# Patient Record
Sex: FEMALE | Race: WHITE | NOT HISPANIC OR LATINO | Employment: FULL TIME | URBAN - METROPOLITAN AREA
[De-identification: names, ages, dates, MRNs, and addresses within clinical notes are randomized per-mention and may not be internally consistent; named-entity substitution may affect disease eponyms.]

---

## 2022-04-09 ENCOUNTER — HOSPITAL ENCOUNTER (EMERGENCY)
Facility: CLINIC | Age: 42
End: 2022-04-09
Payer: COMMERCIAL

## 2022-04-09 ENCOUNTER — HOSPITAL ENCOUNTER (INPATIENT)
Facility: CLINIC | Age: 42
LOS: 3 days | Discharge: HOME | DRG: 419 | End: 2022-04-12
Attending: INTERNAL MEDICINE | Admitting: INTERNAL MEDICINE
Payer: COMMERCIAL

## 2022-04-09 DIAGNOSIS — K80.50 CHOLEDOCHOLITHIASIS: Primary | ICD-10-CM

## 2022-04-09 PROCEDURE — 120N000001 HC R&B MED SURG/OB

## 2022-04-09 PROCEDURE — 99222 1ST HOSP IP/OBS MODERATE 55: CPT | Mod: AI | Performed by: INTERNAL MEDICINE

## 2022-04-09 RX ORDER — PROCHLORPERAZINE MALEATE 5 MG
5 TABLET ORAL EVERY 6 HOURS PRN
Status: DISCONTINUED | OUTPATIENT
Start: 2022-04-09 | End: 2022-04-12 | Stop reason: HOSPADM

## 2022-04-09 RX ORDER — ONDANSETRON 4 MG/1
4 TABLET, ORALLY DISINTEGRATING ORAL EVERY 6 HOURS PRN
Status: DISCONTINUED | OUTPATIENT
Start: 2022-04-09 | End: 2022-04-12 | Stop reason: HOSPADM

## 2022-04-09 RX ORDER — CEFTRIAXONE 2 G/1
2 INJECTION, POWDER, FOR SOLUTION INTRAMUSCULAR; INTRAVENOUS EVERY 24 HOURS
Status: DISCONTINUED | OUTPATIENT
Start: 2022-04-10 | End: 2022-04-12 | Stop reason: HOSPADM

## 2022-04-09 RX ORDER — PROCHLORPERAZINE 25 MG
25 SUPPOSITORY, RECTAL RECTAL EVERY 12 HOURS PRN
Status: DISCONTINUED | OUTPATIENT
Start: 2022-04-09 | End: 2022-04-12 | Stop reason: HOSPADM

## 2022-04-09 RX ORDER — ONDANSETRON 2 MG/ML
4 INJECTION INTRAMUSCULAR; INTRAVENOUS EVERY 6 HOURS PRN
Status: DISCONTINUED | OUTPATIENT
Start: 2022-04-09 | End: 2022-04-12 | Stop reason: HOSPADM

## 2022-04-09 ASSESSMENT — ACTIVITIES OF DAILY LIVING (ADL)
WALKING_OR_CLIMBING_STAIRS_DIFFICULTY: NO
DOING_ERRANDS_INDEPENDENTLY_DIFFICULTY: NO
DIFFICULTY_COMMUNICATING: NO
HEARING_DIFFICULTY_OR_DEAF: NO
WEAR_GLASSES_OR_BLIND: YES
DRESSING/BATHING_DIFFICULTY: NO
CONCENTRATING,_REMEMBERING_OR_MAKING_DECISIONS_DIFFICULTY: NO
FALL_HISTORY_WITHIN_LAST_SIX_MONTHS: NO
DIFFICULTY_EATING/SWALLOWING: NO
TOILETING_ISSUES: NO

## 2022-04-09 ASSESSMENT — COLUMBIA-SUICIDE SEVERITY RATING SCALE - C-SSRS
4. HAVE YOU HAD THESE THOUGHTS AND HAD SOME INTENTION OF ACTING ON THEM?: NO
3. HAVE YOU BEEN THINKING ABOUT HOW YOU MIGHT KILL YOURSELF?: NO
2. HAVE YOU ACTUALLY HAD ANY THOUGHTS OF KILLING YOURSELF IN THE PAST MONTH?: NO
1. IN THE PAST MONTH, HAVE YOU WISHED YOU WERE DEAD OR WISHED YOU COULD GO TO SLEEP AND NOT WAKE UP?: NO

## 2022-04-10 ENCOUNTER — ANESTHESIA (OUTPATIENT)
Dept: SURGERY | Facility: CLINIC | Age: 42
DRG: 419 | End: 2022-04-10
Payer: COMMERCIAL

## 2022-04-10 ENCOUNTER — ANESTHESIA EVENT (OUTPATIENT)
Dept: SURGERY | Facility: CLINIC | Age: 42
DRG: 419 | End: 2022-04-10
Payer: COMMERCIAL

## 2022-04-10 ENCOUNTER — APPOINTMENT (OUTPATIENT)
Dept: GENERAL RADIOLOGY | Facility: CLINIC | Age: 42
DRG: 419 | End: 2022-04-10
Attending: INTERNAL MEDICINE
Payer: COMMERCIAL

## 2022-04-10 LAB
ALBUMIN SERPL-MCNC: 3 G/DL (ref 3.4–5)
ALP SERPL-CCNC: 113 U/L (ref 40–150)
ALT SERPL W P-5'-P-CCNC: 193 U/L (ref 0–50)
ANION GAP SERPL CALCULATED.3IONS-SCNC: 7 MMOL/L (ref 3–14)
AST SERPL W P-5'-P-CCNC: 140 U/L (ref 0–45)
BILIRUB SERPL-MCNC: 3 MG/DL (ref 0.2–1.3)
BUN SERPL-MCNC: 6 MG/DL (ref 7–30)
CALCIUM SERPL-MCNC: 8.3 MG/DL (ref 8.5–10.1)
CHLORIDE BLD-SCNC: 109 MMOL/L (ref 94–109)
CO2 SERPL-SCNC: 23 MMOL/L (ref 20–32)
CREAT SERPL-MCNC: 0.71 MG/DL (ref 0.52–1.04)
ERCP: NORMAL
GFR SERPL CREATININE-BSD FRML MDRD: >90 ML/MIN/1.73M2
GLUCOSE BLD-MCNC: 120 MG/DL (ref 70–99)
INR PPP: 0.99 (ref 0.85–1.15)
POTASSIUM BLD-SCNC: 3.3 MMOL/L (ref 3.4–5.3)
POTASSIUM BLD-SCNC: 3.4 MMOL/L (ref 3.4–5.3)
POTASSIUM BLD-SCNC: 4.2 MMOL/L (ref 3.4–5.3)
PROT SERPL-MCNC: 6.9 G/DL (ref 6.8–8.8)
SODIUM SERPL-SCNC: 139 MMOL/L (ref 133–144)
UPPER EUS: NORMAL

## 2022-04-10 PROCEDURE — 710N000009 HC RECOVERY PHASE 1, LEVEL 1, PER MIN: Performed by: INTERNAL MEDICINE

## 2022-04-10 PROCEDURE — 258N000003 HC RX IP 258 OP 636: Performed by: INTERNAL MEDICINE

## 2022-04-10 PROCEDURE — 999N000179 XR SURGERY CARM FLUORO LESS THAN 5 MIN W STILLS

## 2022-04-10 PROCEDURE — 84132 ASSAY OF SERUM POTASSIUM: CPT | Performed by: INTERNAL MEDICINE

## 2022-04-10 PROCEDURE — 36415 COLL VENOUS BLD VENIPUNCTURE: CPT | Performed by: INTERNAL MEDICINE

## 2022-04-10 PROCEDURE — 250N000011 HC RX IP 250 OP 636: Performed by: INTERNAL MEDICINE

## 2022-04-10 PROCEDURE — 80053 COMPREHEN METABOLIC PANEL: CPT | Performed by: INTERNAL MEDICINE

## 2022-04-10 PROCEDURE — 250N000011 HC RX IP 250 OP 636: Performed by: REGISTERED NURSE

## 2022-04-10 PROCEDURE — 120N000001 HC R&B MED SURG/OB

## 2022-04-10 PROCEDURE — 272N000001 HC OR GENERAL SUPPLY STERILE: Performed by: INTERNAL MEDICINE

## 2022-04-10 PROCEDURE — 258N000003 HC RX IP 258 OP 636: Performed by: ANESTHESIOLOGY

## 2022-04-10 PROCEDURE — 85610 PROTHROMBIN TIME: CPT | Performed by: INTERNAL MEDICINE

## 2022-04-10 PROCEDURE — 99222 1ST HOSP IP/OBS MODERATE 55: CPT | Mod: 57 | Performed by: SURGERY

## 2022-04-10 PROCEDURE — 250N000009 HC RX 250: Performed by: REGISTERED NURSE

## 2022-04-10 PROCEDURE — 258N000003 HC RX IP 258 OP 636: Performed by: PHYSICIAN ASSISTANT

## 2022-04-10 PROCEDURE — 0FC98ZZ EXTIRPATION OF MATTER FROM COMMON BILE DUCT, VIA NATURAL OR ARTIFICIAL OPENING ENDOSCOPIC: ICD-10-PCS | Performed by: INTERNAL MEDICINE

## 2022-04-10 PROCEDURE — 360N000083 HC SURGERY LEVEL 3 W/ FLUORO, PER MIN: Performed by: INTERNAL MEDICINE

## 2022-04-10 PROCEDURE — 99232 SBSQ HOSP IP/OBS MODERATE 35: CPT | Performed by: INTERNAL MEDICINE

## 2022-04-10 PROCEDURE — 250N000013 HC RX MED GY IP 250 OP 250 PS 637: Performed by: INTERNAL MEDICINE

## 2022-04-10 PROCEDURE — 999N000141 HC STATISTIC PRE-PROCEDURE NURSING ASSESSMENT: Performed by: INTERNAL MEDICINE

## 2022-04-10 PROCEDURE — C1769 GUIDE WIRE: HCPCS | Performed by: INTERNAL MEDICINE

## 2022-04-10 PROCEDURE — 370N000017 HC ANESTHESIA TECHNICAL FEE, PER MIN: Performed by: INTERNAL MEDICINE

## 2022-04-10 PROCEDURE — C2617 STENT, NON-COR, TEM W/O DEL: HCPCS | Performed by: INTERNAL MEDICINE

## 2022-04-10 PROCEDURE — C1877 STENT, NON-COAT/COV W/O DEL: HCPCS | Performed by: INTERNAL MEDICINE

## 2022-04-10 PROCEDURE — 250N000009 HC RX 250: Performed by: PHYSICIAN ASSISTANT

## 2022-04-10 DEVICE — STENT BILIARY ADVANIX W/NAVIFLEX 10X5CM M00534680: Type: IMPLANTABLE DEVICE | Site: BILE DUCT | Status: FUNCTIONAL

## 2022-04-10 DEVICE — STENT GEENEN PANCREA 5FRX3CM G22107 GPSO-5-3: Type: IMPLANTABLE DEVICE | Site: PANCREATIC DUCT | Status: FUNCTIONAL

## 2022-04-10 RX ORDER — ACETAMINOPHEN 650 MG/1
650 SUPPOSITORY RECTAL EVERY 6 HOURS PRN
Status: DISCONTINUED | OUTPATIENT
Start: 2022-04-10 | End: 2022-04-12 | Stop reason: HOSPADM

## 2022-04-10 RX ORDER — DEXTROSE MONOHYDRATE, SODIUM CHLORIDE, AND POTASSIUM CHLORIDE 50; 1.49; 4.5 G/1000ML; G/1000ML; G/1000ML
INJECTION, SOLUTION INTRAVENOUS CONTINUOUS
Status: DISCONTINUED | OUTPATIENT
Start: 2022-04-10 | End: 2022-04-12

## 2022-04-10 RX ORDER — SODIUM CHLORIDE, SODIUM LACTATE, POTASSIUM CHLORIDE, CALCIUM CHLORIDE 600; 310; 30; 20 MG/100ML; MG/100ML; MG/100ML; MG/100ML
INJECTION, SOLUTION INTRAVENOUS CONTINUOUS
Status: DISCONTINUED | OUTPATIENT
Start: 2022-04-10 | End: 2022-04-10 | Stop reason: HOSPADM

## 2022-04-10 RX ORDER — NALOXONE HYDROCHLORIDE 0.4 MG/ML
0.4 INJECTION, SOLUTION INTRAMUSCULAR; INTRAVENOUS; SUBCUTANEOUS
Status: DISCONTINUED | OUTPATIENT
Start: 2022-04-10 | End: 2022-04-12 | Stop reason: HOSPADM

## 2022-04-10 RX ORDER — PROPOFOL 10 MG/ML
INJECTION, EMULSION INTRAVENOUS PRN
Status: DISCONTINUED | OUTPATIENT
Start: 2022-04-10 | End: 2022-04-10

## 2022-04-10 RX ORDER — ONDANSETRON 2 MG/ML
4 INJECTION INTRAMUSCULAR; INTRAVENOUS EVERY 6 HOURS PRN
Status: DISCONTINUED | OUTPATIENT
Start: 2022-04-10 | End: 2022-04-12 | Stop reason: HOSPADM

## 2022-04-10 RX ORDER — LIDOCAINE 40 MG/G
CREAM TOPICAL
Status: DISCONTINUED | OUTPATIENT
Start: 2022-04-10 | End: 2022-04-12 | Stop reason: HOSPADM

## 2022-04-10 RX ORDER — LEVOTHYROXINE SODIUM 75 UG/1
150 TABLET ORAL DAILY
Status: DISCONTINUED | OUTPATIENT
Start: 2022-04-10 | End: 2022-04-10

## 2022-04-10 RX ORDER — FLUMAZENIL 0.1 MG/ML
0.2 INJECTION, SOLUTION INTRAVENOUS
Status: ACTIVE | OUTPATIENT
Start: 2022-04-10 | End: 2022-04-11

## 2022-04-10 RX ORDER — NALOXONE HYDROCHLORIDE 0.4 MG/ML
0.2 INJECTION, SOLUTION INTRAMUSCULAR; INTRAVENOUS; SUBCUTANEOUS
Status: DISCONTINUED | OUTPATIENT
Start: 2022-04-10 | End: 2022-04-12 | Stop reason: HOSPADM

## 2022-04-10 RX ORDER — ONDANSETRON 2 MG/ML
INJECTION INTRAMUSCULAR; INTRAVENOUS PRN
Status: DISCONTINUED | OUTPATIENT
Start: 2022-04-10 | End: 2022-04-10

## 2022-04-10 RX ORDER — NORGESTIMATE AND ETHINYL ESTRADIOL 7DAYSX3 LO
1 KIT ORAL DAILY
COMMUNITY

## 2022-04-10 RX ORDER — OXYCODONE HYDROCHLORIDE 5 MG/1
5 TABLET ORAL EVERY 4 HOURS PRN
Status: DISCONTINUED | OUTPATIENT
Start: 2022-04-10 | End: 2022-04-11

## 2022-04-10 RX ORDER — LEVOTHYROXINE SODIUM 200 UG/1
200 TABLET ORAL SEE ADMIN INSTRUCTIONS
COMMUNITY

## 2022-04-10 RX ORDER — PROPOFOL 10 MG/ML
INJECTION, EMULSION INTRAVENOUS CONTINUOUS PRN
Status: DISCONTINUED | OUTPATIENT
Start: 2022-04-10 | End: 2022-04-10

## 2022-04-10 RX ORDER — FENTANYL CITRATE 50 UG/ML
INJECTION, SOLUTION INTRAMUSCULAR; INTRAVENOUS PRN
Status: DISCONTINUED | OUTPATIENT
Start: 2022-04-10 | End: 2022-04-10

## 2022-04-10 RX ORDER — LEVOTHYROXINE SODIUM 100 UG/1
200 TABLET ORAL
Status: DISCONTINUED | OUTPATIENT
Start: 2022-04-11 | End: 2022-04-12 | Stop reason: HOSPADM

## 2022-04-10 RX ORDER — ALBUTEROL SULFATE 90 UG/1
1-2 AEROSOL, METERED RESPIRATORY (INHALATION) EVERY 6 HOURS PRN
COMMUNITY

## 2022-04-10 RX ORDER — ONDANSETRON 4 MG/1
4 TABLET, ORALLY DISINTEGRATING ORAL EVERY 6 HOURS PRN
Status: DISCONTINUED | OUTPATIENT
Start: 2022-04-10 | End: 2022-04-12 | Stop reason: HOSPADM

## 2022-04-10 RX ORDER — LIDOCAINE HYDROCHLORIDE 20 MG/ML
INJECTION, SOLUTION INFILTRATION; PERINEURAL PRN
Status: DISCONTINUED | OUTPATIENT
Start: 2022-04-10 | End: 2022-04-10

## 2022-04-10 RX ORDER — ACETAMINOPHEN 325 MG/1
650 TABLET ORAL EVERY 6 HOURS PRN
Status: DISCONTINUED | OUTPATIENT
Start: 2022-04-10 | End: 2022-04-12 | Stop reason: HOSPADM

## 2022-04-10 RX ORDER — HYDROMORPHONE HCL IN WATER/PF 6 MG/30 ML
0.2 PATIENT CONTROLLED ANALGESIA SYRINGE INTRAVENOUS
Status: DISCONTINUED | OUTPATIENT
Start: 2022-04-10 | End: 2022-04-12 | Stop reason: HOSPADM

## 2022-04-10 RX ORDER — POTASSIUM CHLORIDE 1500 MG/1
40 TABLET, EXTENDED RELEASE ORAL ONCE
Status: COMPLETED | OUTPATIENT
Start: 2022-04-10 | End: 2022-04-10

## 2022-04-10 RX ADMIN — CEFTRIAXONE SODIUM 2 G: 2 INJECTION, POWDER, FOR SOLUTION INTRAMUSCULAR; INTRAVENOUS at 08:50

## 2022-04-10 RX ADMIN — FAMOTIDINE 20 MG: 10 INJECTION, SOLUTION INTRAVENOUS at 09:09

## 2022-04-10 RX ADMIN — SODIUM CHLORIDE, POTASSIUM CHLORIDE, SODIUM LACTATE AND CALCIUM CHLORIDE: 600; 310; 30; 20 INJECTION, SOLUTION INTRAVENOUS at 13:15

## 2022-04-10 RX ADMIN — OXYCODONE HYDROCHLORIDE 5 MG: 5 TABLET ORAL at 17:26

## 2022-04-10 RX ADMIN — LIDOCAINE HYDROCHLORIDE 100 MG: 20 INJECTION, SOLUTION INFILTRATION; PERINEURAL at 13:46

## 2022-04-10 RX ADMIN — POTASSIUM CHLORIDE 40 MEQ: 1500 TABLET, EXTENDED RELEASE ORAL at 11:18

## 2022-04-10 RX ADMIN — POTASSIUM CHLORIDE, DEXTROSE MONOHYDRATE AND SODIUM CHLORIDE: 150; 5; 450 INJECTION, SOLUTION INTRAVENOUS at 09:18

## 2022-04-10 RX ADMIN — DEXTROSE AND SODIUM CHLORIDE: 5; 900 INJECTION, SOLUTION INTRAVENOUS at 00:49

## 2022-04-10 RX ADMIN — FENTANYL CITRATE 50 MCG: 50 INJECTION, SOLUTION INTRAMUSCULAR; INTRAVENOUS at 13:47

## 2022-04-10 RX ADMIN — PROPOFOL 100 MCG/KG/MIN: 10 INJECTION, EMULSION INTRAVENOUS at 13:49

## 2022-04-10 RX ADMIN — PROPOFOL 20 MG: 10 INJECTION, EMULSION INTRAVENOUS at 13:48

## 2022-04-10 RX ADMIN — MIDAZOLAM 2 MG: 1 INJECTION INTRAMUSCULAR; INTRAVENOUS at 13:38

## 2022-04-10 RX ADMIN — FAMOTIDINE 20 MG: 10 INJECTION, SOLUTION INTRAVENOUS at 21:59

## 2022-04-10 RX ADMIN — POTASSIUM CHLORIDE, DEXTROSE MONOHYDRATE AND SODIUM CHLORIDE: 150; 5; 450 INJECTION, SOLUTION INTRAVENOUS at 16:54

## 2022-04-10 RX ADMIN — PROPOFOL 20 MG: 10 INJECTION, EMULSION INTRAVENOUS at 13:53

## 2022-04-10 RX ADMIN — PROPOFOL 10 MG: 10 INJECTION, EMULSION INTRAVENOUS at 13:51

## 2022-04-10 RX ADMIN — ACETAMINOPHEN 650 MG: 325 TABLET ORAL at 09:08

## 2022-04-10 RX ADMIN — FENTANYL CITRATE 50 MCG: 50 INJECTION, SOLUTION INTRAMUSCULAR; INTRAVENOUS at 13:51

## 2022-04-10 RX ADMIN — ONDANSETRON 4 MG: 2 INJECTION INTRAMUSCULAR; INTRAVENOUS at 14:01

## 2022-04-10 RX ADMIN — LEVOTHYROXINE SODIUM 150 MCG: 75 TABLET ORAL at 06:44

## 2022-04-10 ASSESSMENT — ACTIVITIES OF DAILY LIVING (ADL)
ADLS_ACUITY_SCORE: 4
ADLS_ACUITY_SCORE: 6
ADLS_ACUITY_SCORE: 4
ADLS_ACUITY_SCORE: 6
ADLS_ACUITY_SCORE: 4
ADLS_ACUITY_SCORE: 6
ADLS_ACUITY_SCORE: 4
ADLS_ACUITY_SCORE: 6
ADLS_ACUITY_SCORE: 4
ADLS_ACUITY_SCORE: 4

## 2022-04-10 NOTE — PROGRESS NOTES
Patient is alert and oriented x4, VSS on room air. Up independently in room. NPO, IV fluid infusing, patient is c/o of pain but has declined pain medications offered. Will continue to monitor.

## 2022-04-10 NOTE — PROVIDER NOTIFICATION
MD Notification    Notified Person: MD    Notified Person Name: Dr. RAUL La    Notification Date/Time: 4/10/22 7:50 AM    Notification Interaction: paged    Purpose of Notification: elevated Bilirubin of 3.0,  and     Orders Received:    Comments:

## 2022-04-10 NOTE — ANESTHESIA POSTPROCEDURE EVALUATION
Patient: Maria Isabel Garcia    Procedure: Procedure(s):  ENDOSCOPIC RETROGRADE CHOLANGIOPANCREATOGRAPHY, COMPLEX, sphincterotomy, stent placement, stone removal       Anesthesia Type:  MAC    Note:  Disposition: Inpatient   Postop Pain Control: Uneventful            Sign Out: Well controlled pain   PONV:    Neuro/Psych: Uneventful            Sign Out: Acceptable/Baseline neuro status   Airway/Respiratory: Uneventful            Sign Out: Acceptable/Baseline resp. status   CV/Hemodynamics: Uneventful            Sign Out: Acceptable CV status   Other NRE: NONE   DID A NON-ROUTINE EVENT OCCUR? No           Last vitals:  Vitals Value Taken Time   /83 04/10/22 1450   Temp 36.4  C (97.5  F) 04/10/22 1420   Pulse 59 04/10/22 1450   Resp 6 04/10/22 1450   SpO2 100 % 04/10/22 1450   Vitals shown include unvalidated device data.    Electronically Signed By: Carlos Eduardo Roberts MD  April 10, 2022  2:52 PM

## 2022-04-10 NOTE — PROGRESS NOTES
General Surgery - Chart Update    Patient seen by GI. Plan for EUS possible ERCP with Dr. Rob today.     - OK for clear liquids after GI procedure today if also OK with GI  - NPO at midnight  - Plan for laparoscopic cholecystectomy with Dr. Desir tomorrow at 0930. Orders placed  - Possible discharge home tomorrow vs Tuesday pending procedures, improvement in labs  - Recheck LFTs tomorrow AM      Maribell Shoemaker PA-C  [facility]  [phone]

## 2022-04-10 NOTE — PLAN OF CARE
A/Ox4. VSS on RA. Independent in room. C/o headache once, gave PRN Tylenol with help. NPO for ERCP today. Denies nause. +BS with RUQ tenderness. Voiding well per BR. Urine is dark martin, denies any burning sensation w/ urination. Hospitalist made aware. Sent to pre op this afternoon for her procedure.

## 2022-04-10 NOTE — PROGRESS NOTES
[facility]    Hospitalist Progress Note    Assessment & Plan   Maria Isabel Garcia is a 41 year old female admitted on 4/9/2022. She presents as a direct admission from [facility] emergency department with persistent abdominal pain, nausea and vomiting worsened with eating, increasing LFTs, recently performed right upper quadrant abdominal ultrasound with CBD defect concerning for choledocholithiasis     Choledocholithiasis:  Biliary colic  Cholelithiasis   Patient with right upper quadrant pain, postprandial discomfort and nausea and vomiting since Wednesday, more persistent since Friday night.  Increasing LFTs and 4/7/2022 ultrasound at outside facility with mid CBD defect concerning for sludge/choledocholithiasis  -Appreciate GI input, patient for EGD and EUS today.  --Appreciate surgery input, plan for cholecystectomy possibly on 4/11  -Patient has been started on ceftriaxone 2 g daily, not sure patient requires this since there was no sign of cholecystitis.  -Monitor LFTs in a.m.  Continue supportive care     Hypothyroidism:  -Continue levothyroxine 150 mcg daily     Anemia: Patient with a hemoglobin of 11.3 at outside emergency department.   DVT Prophylaxis: SCDs  Code Status: Full Code     Disposition: Expected discharge possibly 4/12 versus 4/11    Makayla La MD  Text Page   (7am to 6pm)    Interval History   Patient is resting, plan for EGD and ERCP later today.  Pain is better controlled.  Plan for cholecystectomy noted for tomorrow.    -Data reviewed today: I reviewed all new labs and imaging results over the last 24 hours.   Physical Exam   Temp: 99.2  F (37.3  C) Temp src: Oral BP: 110/68 Pulse: 69   Resp: 16 SpO2: 98 % O2 Device: None (Room air)    Vitals:    04/10/22 1000   Weight: 67 kg (147 lb 11.2 oz)     Vital Signs with Ranges  Temp:  [98  F (36.7  C)-99.2  F (37.3  C)] 99.2  F (37.3  C)  Pulse:  [69-76] 69  Resp:  [16] 16  BP: (110-136)/(68-87) 110/68  SpO2:  [98 %] 98  %  No intake/output data recorded.    Constitutional: Awake, alert, cooperative, no apparent distress  Respiratory: Clear to auscultation bilaterally, no crackles or wheezing  Cardiovascular: Regular rate and rhythm, normal S1 and S2, and no murmur noted  GI: Normal bowel sounds, soft, right upper quadrant tenderness noted.  Skin/Integumen: No rashes, no cyanosis, no edema  Neuro : moving all 4 extremities, no focal deficit noted     Medications     dextrose 5% and 0.45% NaCl + KCl 20 mEq/L 100 mL/hr at 04/10/22 0918       cefTRIAXone  2 g Intravenous Q24H     famotidine  20 mg Intravenous Q12H     [START ON 4/11/2022] levothyroxine  200 mcg Oral Once per day on Sun Mon Tue Wed Thu Sat     sodium chloride (PF)  3 mL Intracatheter Q8H       Data   Recent Labs   Lab 04/10/22  0755 04/10/22  0606   INR  --  0.99   NA  --  139   POTASSIUM 3.4 3.3*   CHLORIDE  --  109   CO2  --  23   BUN  --  6*   CR  --  0.71   ANIONGAP  --  7   HARLEY  --  8.3*   GLC  --  120*   ALBUMIN  --  3.0*   PROTTOTAL  --  6.9   BILITOTAL  --  3.0*   ALKPHOS  --  113   ALT  --  193*   AST  --  140*     Recent Labs   Lab 04/10/22  0606   *       Imaging:   No results found for this or any previous visit (from the past 24 hour(s)).

## 2022-04-10 NOTE — PHARMACY-ADMISSION MEDICATION HISTORY
Pharmacy Medication History  Admission medication history interview status for the 4/9/2022  admission is complete. See EPIC admission navigator for prior to admission medications     Location of Interview: Patient room  Medication history sources: Patient, Surescripts and Care Everywhere    Significant changes made to the medication list:  NA - New list    In the past week, patient estimated taking medication this percent of the time: 50-90% due to illness    Additional medication history information:   Patient hasn't taken her birth control in a few days due to illness but states she did take her levothyroxine yesterday morning as usual.     Uses her inhaler 1-2 times a month     Medication reconciliation completed by provider prior to medication history? Yes    Time spent in this activity: 20 minutes    Prior to Admission medications    Medication Sig Last Dose Taking? Auth Provider   albuterol (PROAIR HFA/PROVENTIL HFA/VENTOLIN HFA) 108 (90 Base) MCG/ACT inhaler Inhale 1-2 puffs into the lungs every 6 hours as needed for shortness of breath / dyspnea or wheezing  at PRN Yes Unknown, Entered By History   levothyroxine (SYNTHROID/LEVOTHROID) 200 MCG tablet Take 200 mcg by mouth See Admin Instructions Six days a week 4/9/2022 at Unknown time Yes Unknown, Entered By History   norgestim-eth estrad triphasic (ORTHO TRI-CYCLEN LO) 0.18/0.215/0.25 MG-25 MCG tablet Take 1 tablet by mouth in the morning. Past Week at Unknown time Yes Unknown, Entered By History       The information provided in this note is only as accurate as the sources available at the time of update(s)

## 2022-04-10 NOTE — CONSULTS
[facility]  Gastroenterology Consultation         Maria Isabel Garcia  [address]  [facility] [place] [address]  41 year old female    Admission Date/Time: 4/9/2022  Primary Care Provider: Hadley Mejia  Referring / Attending Physician: Fer Ramos    We were asked to see the patient in consultation by Dr. Ramos for evaluation of right upper quadrant abdominal pain elevated LFTs.      CC: Recurrent biliary colic    HPI:  Maria Isabel Garcia is a 41 year old female who was admitted after patient was seen in [facility] with acute onset of severe abdominal pain in the epigastric area right upper quadrant area associated with nausea vomiting elevated liver function tests.  Patient had ultrasound of the abdomen done which showed multiple gallstones with mildly dilated bile duct and suspected sludge versus stone in the gallbladder and in the common bile duct.  Patient was given IV antibiotics Zofran.  Patient was admitted to [facility].  Patient is overall starting to feel better patient is still complaining of some discomfort patient has been dealing with these episodes multiple times.  Patient denied any other significant systemic complaint denied any history of fever chills chest pain.  Her other past medical history significant for hypothyroidism and mild anemia.  No other significant systemic complaint rest of review of system is negative.    ROS: A comprehensive ten point review of systems was negative aside from those in mentioned in the HPI.      PAST MED HX:  I have reviewed this patient's medical history and updated it with pertinent information if needed.   Past Medical History:   Diagnosis Date     Abnormal Pap smear      Asthma     excercise induced     Herpes simplex without mention of complication      Hx: UTI (urinary tract infection)     requirig ureter surgery     Hypothyroid     sees Dr. Zander Mann in [place]       MEDICATIONS:   Prior to Admission Medications   Prescriptions Last  Dose Informant Patient Reported? Taking?   albuterol (PROAIR HFA/PROVENTIL HFA/VENTOLIN HFA) 108 (90 Base) MCG/ACT inhaler  at PRN Self Yes Yes   Sig: Inhale 1-2 puffs into the lungs every 6 hours as needed for shortness of breath / dyspnea or wheezing   levothyroxine (SYNTHROID/LEVOTHROID) 200 MCG tablet 4/9/2022 at Unknown time Self Yes Yes   Sig: Take 200 mcg by mouth See Admin Instructions Six days a week   norgestim-eth estrad triphasic (ORTHO TRI-CYCLEN LO) 0.18/0.215/0.25 MG-25 MCG tablet Past Week at Unknown time Self Yes Yes   Sig: Take 1 tablet by mouth in the morning.      Facility-Administered Medications: None       ALLERGIES:   Allergies   Allergen Reactions     Keflex [Cephalexin Hcl]      rash     Sulfa Drugs      Shortness of breath       SOCIAL HISTORY:  Social History     Tobacco Use     Smoking status: Never Smoker   Substance Use Topics     Alcohol use: No       FAMILY HISTORY:  Family History   Problem Relation Age of Onset     Heart Disease Father        PHYSICAL EXAM:   General awake alert oriented comfortable  Vital Signs with Ranges  Temp: 97.5  F (36.4  C) Temp src: Oral BP: 130/72 Pulse: 89   Resp: 16 SpO2: 100 % O2 Device: None (Room air) Oxygen Delivery: 2 LPM  No intake/output data recorded.    Constitutional: healthy, alert and no distress   Cardiovascular: negative, PMI normal. No lifts, heaves, or thrills. RRR. No murmurs, clicks gallops or rub  Respiratory: negative, Percussion normal. Good diaphragmatic excursion. Lungs clear  Head: Normocephalic. No masses, lesions, tenderness or abnormalities  Neck: Neck supple. No adenopathy. Thyroid symmetric, normal size,, Carotids without bruits.  Abdomen: Abdomen soft, non-tender. BS normal. No masses, organomegaly  SKIN: no suspicious lesions or rashes          ADDITIONAL COMMENTS:   I reviewed the patient's new clinical lab test results.   Recent Labs   Lab Test 04/10/22  0606   INR 0.99     Recent Labs   Lab Test 04/10/22  2644  04/10/22  0606   POTASSIUM 3.4 3.3*   CHLORIDE  --  109   CO2  --  23   BUN  --  6*   ANIONGAP  --  7     Recent Labs   Lab Test 04/10/22  0606   ALBUMIN 3.0*   BILITOTAL 3.0*   *   *       I reviewed the patient's new imaging results.        CONSULTATION ASSESSMENT AND PLAN:    Active Problems:    Choledocholithiasis    Assessment: Very pleasant 41-year-old female with a history of recurrent biliary colic abdominal pain in the epigastric area along with abnormal liver function test and ultrasound findings suspicious for possible common bile duct stone.  Patient history is quite consistent with biliary colic.  Plan to proceed with endoscopic ultrasound and possible ERCP agree with the current plan to continue on supportive care IV fluid IV pain control IV antibiotics appreciate surgical consult I suspect patient will need cholecystectomy.  Finding and plan including risk-benefit discussed in detail with patient and family.  Thank you very much for letting me participate in her care.                Irwin Rob MD, FACP  [facility].  Office: [phone]  Cell : [phone]      [facility]  Ph: [phone] Fax: [phone]                      Recurrent abdominal pain. Biliary colic  Elevated LFTs  NPO  Plan for EUS and Possible ERCP today    Irwin Rob MD FACP  [facility]

## 2022-04-10 NOTE — PROGRESS NOTES
EUS findings consistent with multiple CBD stones and moderate to large amount of sludge in the common bile duct.  ERCP findings consistent with multiple CBD stones and sludge.  Sphincterotomy was done CBD stent was placed and also pancreatic duct stent was placed.  Clear liquid diet today.  Continue on current treatment plan.  Cholecystectomy tomorrow.    Irwin Rob MD

## 2022-04-10 NOTE — CONSULTS
General Surgery Consultation  We are asked by Dr. Fer Ramos to see Maria Isabel Garcia in consultation regarding choledocholithiasis.    Maria Isabel Garcia  YOB: 1980    Age: 41 year old  MRN#: [id number]    Date of Admission: 2022  Surgeon:   Naren Hayward MD                  Chief Complaint:   Abdominal pain         History of Present Illness:   This patient is a 41 year old female who was [facility] from an outside facility for findings of choledocholithiasis. Maria Isabel tells me she has had right upper quadrant abdominal pain with nausea/vomiting especially after eating that started on Wednesday. This worsened and prompted her to seek care at the [facility] yesterday. Results from outside facility are viewable in CareEverywhere. On presentation her labs included WBC 9.5, hemoglobin 11.3, platelets 333, total bili 4.0, , , Alk phos 119, lipase 62.  Maria Isabel states that she has never had similar symptoms in the past. She has had 3 pregnancies, 2 living children (youngest 8yo), no issues during pregnancy.  Surgical history includes  section x 2. No history of general anesthesia. History is obtained from the patient and chart. Patient denies any known personal or family history of bleeding disorder, clotting disorder, anesthesia reaction. Maria Isabel tells me she is currently feeling much better since she has been NPO.         Past Medical History:    has a past medical history of Abnormal Pap smear, Asthma, Herpes simplex without mention of complication, UTI (urinary tract infection), and Hypothyroid.          Past Surgical History:     Past Surgical History:   Procedure Laterality Date     C/SECTION, LOW TRANSVERSE        SECTION  10/21/2013    Procedure:  SECTION;  REPEAT  SECTION;  Surgeon: Hadley Mejia MD;  Location:  L+D     COLPOSCOPY CERVIX, LOOP ELECTRODE BIOPSY, COMBINED              Medications:     Prior to Admission medications     Medication Sig Start Date End Date Taking? Authorizing Provider   ibuprofen (ADVIL,MOTRIN) 400-800 mg tablet Take 1-2 tablets (400-800 mg) by mouth every 6 hours as needed (cramping) 10/24/13   Hadley Mejia MD   Levothyroxine Sodium (SYNTHROID PO) Take 150 mcg by mouth daily     Reported, Patient   oxyCODONE (ROXICODONE) 5 MG immediate release tablet Take 1-2 tablets (5-10 mg) by mouth every 3 hours as needed 10/24/13   Hadley Mejia MD   Prenatal Vit-Fe Fumarate-FA (PRENATAL MULTIVITAMIN  PLUS IRON) 27-0.8 MG TABS Take 1 tablet by mouth daily    Reported, Patient   senna-docusate (SENOKOT-S;PERICOLACE) 8.6-50 MG per tablet Take 1-2 tablets by mouth 2 times daily 10/24/13   Hadley Mejia MD            Allergies:     Allergies   Allergen Reactions     Keflex [Cephalexin Hcl]      rash     Sulfa Drugs      Shortness of breath            Social History:     Social History     Tobacco Use     Smoking status: Never Smoker     Smokeless tobacco: Not on file   Substance Use Topics     Alcohol use: No             Family History:   POSITIVE for heart disease. This patient has no pertinent family history, specifically no family history of any bleeding, clotting or anesthesia problems.          Review of Systems:   Brief ROS is negative other than noted in the HPI.  Constitutional: NEGATIVE for fever, chills, change in weight  Respiratory: NEGATIVE for significant cough or SOB  Cardiovascular: NEGATIVE for chest pain, palpitations or peripheral edema  Gastrointestinal: POSITIVE for abdominal pain, nausea, vomiting   Hematologic: NEGATIVE for bleeding problems         Physical Exam:   Blood pressure 110/68, pulse 69, temperature 99.2  F (37.3  C), temperature source Oral, resp. rate 16, SpO2 98 %, unknown if currently breastfeeding.  No intake/output data recorded.    General - This is a very pleasant, well developed, well nourished female in no apparent distress. Alert and oriented x 3.  Head -  normocephalic, atraumatic  Eyes - no scleral icterus, extraocular movements intact  Neck - supple without masses, trachea midline, no lymphadenopathy or thyromegaly  Respiratory - lungs clear to auscultation bilaterally without wheezes, rales or rhonchi   Cardiovascular - regular rate and rhythm; S1 and S2 distinct without murmur   Abdomen - Soft, nondistended. Some mild RUQ tenderness (improved today per patient). No Valentin sign. No palpable masses or organomegaly. No rebound or guarding  Extremities - Moves all extremities. Warm without edema. No calf tenderness  Neurologic - Nonfocal          Data:   Labs:  Recent Labs   Lab Test 04/10/22  0606   POTASSIUM 3.3*   CHLORIDE 109   CO2 23   BUN 6*   CR 0.71     Recent Labs   Lab Test 04/10/22  0606   BILITOTAL 3.0*   *   *   ALKPHOS 113     Recent Labs   Lab Test 04/10/22  0606   INR 0.99     Recent Labs   Lab Test 04/10/22  0606   HARLEY 8.3*     Recent Labs   Lab Test 04/10/22  0606   ANIONGAP 7   ALBUMIN 3.0*       Ultrasound of the abdomen:   FINDINGS:   Liver: The liver is somewhat increased in size with demonstration of likely a elongated Riedel`s lobe. No masses.  No intrahepatic biliary dilatation.       Gallbladder: There are dependent calculi appreciated within the gallbladder lumen. Normal wall thickness.  No pericholecystic fluid.       Common bile duct: 5 mm. There is questionable radiopaque sludge within the common bile duct versus a small choledocholith.     Pancreas: Normal.       Right kidney: The right kidney is normal in size contour and echogenicity.     Impression:   Cholelithiasis and questionable choledocholithiasis with radiopaque debris/calculus within the mid common bile duct. Correlate with MRCP.     Mild hepatomegaly and hepatic steatosis.            Assessment:   Maria Isabel Garcia is a 41 year old female with symptomatic cholelithiasis, choledocholithiasis. Total bilirubin improving today (4.0 > 3.0).          Plan:   - GI consult  for EUS, likely ERCP. Await recommendations. Possibly passed a stone given improvement in bilirubin and pain, but would likely still benefit from procedure to evaluate biliary tree and clear any residual debris.  - Discussed cholecystectomy during this admission to prevent future occurrences. Patient is interested in this. Briefly discussed surgical details, risks, benefits, alternatives, expected recovery. Surgery would likely be done the day after EUS/ERCP unless we are able to coordinate OR timing to be done concurrently.   - Continue NPO. IV fluids changed to K-containing due to slight hypokalemia. Anti-emetics and pain medications available prn  - Activity as tolerated. PCDs while resting  - No evidence of cholecystitis, but agree with Rocephin until GI procedure complete  - Pepcid BID  - Medical management per hospitalist     I have discussed the history, physical, and plan with Dr. Hayward who will independently interview and examine the patient and update the stated plan as indicated.        Maribell Shoemaker PA-C  [facility]  [phone]

## 2022-04-10 NOTE — ANESTHESIA PREPROCEDURE EVALUATION
Anesthesia Pre-Procedure Evaluation    Patient: Maria Isabel Garcia   MRN: [id number] : 1980        Procedure : Procedure(s):  ENDOSCOPIC RETROGRADE CHOLANGIOPANCREATOGRAPHY, COMPLEX          Past Medical History:   Diagnosis Date     Abnormal Pap smear      Asthma     excercise induced     Herpes simplex without mention of complication      Hx: UTI (urinary tract infection)     requirig ureter surgery     Hypothyroid     sees Dr. Zander Mann in [place]      Past Surgical History:   Procedure Laterality Date     C/SECTION, LOW TRANSVERSE        SECTION  10/21/2013    Procedure:  SECTION;  REPEAT  SECTION;  Surgeon: Hadley Mejia MD;  Location:  L+D     COLPOSCOPY CERVIX, LOOP ELECTRODE BIOPSY, COMBINED        Allergies   Allergen Reactions     Keflex [Cephalexin Hcl]      rash     Sulfa Drugs      Shortness of breath      Social History     Tobacco Use     Smoking status: Never Smoker     Smokeless tobacco: Not on file   Substance Use Topics     Alcohol use: No      Wt Readings from Last 1 Encounters:   04/10/22 67 kg (147 lb 11.2 oz)        Anesthesia Evaluation   Pt has had prior anesthetic.     No history of anesthetic complications       ROS/MED HX  ENT/Pulmonary:     (+) Intermittent, asthma     Neurologic:  - neg neurologic ROS     Cardiovascular:  - neg cardiovascular ROS  (-) murmur   METS/Exercise Tolerance:     Hematologic:  - neg hematologic  ROS     Musculoskeletal:  - neg musculoskeletal ROS     GI/Hepatic:     (+) cholecystitis/cholelithiasis,     Renal/Genitourinary:  - neg Renal ROS     Endo:     (+) thyroid problem, hypothyroidism,  (-) Type II DM   Psychiatric/Substance Use:  - neg psychiatric ROS     Infectious Disease:  - neg infectious disease ROS     Malignancy:  - neg malignancy ROS     Other:  - neg other ROS          Physical Exam    Airway        Mallampati: II   TM distance: > 3 FB   Neck ROM: full   Mouth opening: > 3 cm    Respiratory Devices and  Support         Dental  no notable dental history         Cardiovascular   cardiovascular exam normal       Rhythm and rate: regular (-) no murmur    Pulmonary   pulmonary exam normal                OUTSIDE LABS:  CBC:   Lab Results   Component Value Date    HGB 10.3 (L) 10/22/2013    HGB 12.5 10/21/2013    HCT 38.3 03/14/2013     K 03/14/2013     BMP:   Lab Results   Component Value Date     04/10/2022    POTASSIUM 3.4 04/10/2022    POTASSIUM 3.3 (L) 04/10/2022    CHLORIDE 109 04/10/2022    CO2 23 04/10/2022    BUN 6 (L) 04/10/2022    CR 0.71 04/10/2022     (H) 04/10/2022     COAGS:   Lab Results   Component Value Date    INR 0.99 04/10/2022     POC: No results found for: BGM, HCG, HCGS  HEPATIC:   Lab Results   Component Value Date    ALBUMIN 3.0 (L) 04/10/2022    PROTTOTAL 6.9 04/10/2022     (H) 04/10/2022     (H) 04/10/2022    ALKPHOS 113 04/10/2022    BILITOTAL 3.0 (H) 04/10/2022     OTHER:   Lab Results   Component Value Date    HARLEY 8.3 (L) 04/10/2022       Anesthesia Plan    ASA Status:  2   NPO Status:  NPO Appropriate    Anesthesia Type: MAC.     - Reason for MAC: straight local not clinically adequate   Induction: Intravenous, Propofol.           Consents    Anesthesia Plan(s) and associated risks, benefits, and realistic alternatives discussed. Questions answered and patient/representative(s) expressed understanding.    - Discussed:     - Discussed with:  Patient      - Extended Intubation/Ventilatory Support Discussed: No.      - Patient is DNR/DNI Status: No    Use of blood products discussed: No .     Postoperative Care    Pain management: IV analgesics.   PONV prophylaxis: Ondansetron (or other 5HT-3), Dexamethasone or Solumedrol     Comments:    Other Comments: Patient is counseled on the anesthesia plan and relevant anesthesia procedures including all risks and benefits. All patient questions were answered.             Carlos Eduardo Roberts MD

## 2022-04-10 NOTE — H&P
[facility]    History and Physical - Hospitalist Service       Date of Admission:  4/9/2022    Assessment & Plan      Maria Isabel Garcia is a 41 year old female admitted on 4/9/2022. She presents as a direct admission from [facility] emergency department with persistent abdominal pain, nausea and vomiting worsened with eating, increasing LFTs, recently performed right upper quadrant abdominal ultrasound with CBD defect concerning for choledocholithiasis    Choledocholithiasis: Patient with right upper quadrant pain, postprandial discomfort and nausea and vomiting since Wednesday, more persistent since Friday night.  Increasing LFTs and 4/7/2022 ultrasound at outside facility with mid CBD defect concerning for sludge/choledocholithiasis  -As needed Zofran, Compazine for nausea and vomiting  -D5 half-normal saline plus potassium at 100/h  -Gastroenterology consulted anticipating ERCP  -General surgery consulted for cholecystectomy prior to discharge  -Repeat CMP and INR in a.m.  If significant worsening LFTs, will likely require ERCP, if improving, argument could be made for intraoperative cholangiogram  -Ceftriaxone 2 g every 24 hours until ERCP complete  -Acetaminophen, oxycodone, as needed Dilaudid for pain control.  If significant worsening of LFTs, can discontinue acetaminophen    Hypothyroidism:  -Continue levothyroxine 150 mcg daily    Anemia: Patient with a hemoglobin of 11.3 at outside emergency department.  MCV of 79.  Most consistent with iron deficiency anemia.  -Defer ongoing work-up and management to primary care physician.  Would not initiate iron supplementation currently with choledocholithiasis given risk of developing infectious process    Diet:  N.p.o. anticipating procedural intervention  DVT Prophylaxis: Pneumatic Compression Devices  Solomon Catheter: Not present  Central Lines: None  Cardiac Monitoring: None  Code Status:  Full code.  Confirmed with patient on  admission    Clinically Significant Risk Factors Present on Admission                      Disposition Plan   Expected Discharge:  anticipated discharge Monday or Tuesday pending timing of ERCP and surgical intervention with subsequent recovery  Anticipated discharge location:  Awaiting care coordination huddle  Delays:             The patient's care was discussed with the Patient and Transferring provider at [facility] ER.    Fer Ramos MD  Hospitalist Service  [facility]  Securely message with the Vocera Web Console (learn more here)  Text page via [employer] Paging/Directory         ______________________________________________________________________    Chief Complaint   Abdominal pain, nausea and vomiting    History is obtained from patient, chart review, review of outside records including recent upper quadrant ultrasound report with CBD finding concerning for choledocholithiasis without biliary dilation, discussion with transferring ER provider    History of Present Illness   Maria Isabel Garcia is a 41 year old female who presents as a direct admission from [facility] emergency department with concerns for choledocholithiasis.    Patient is a generally healthy 41-year-old female.  She has never had abdominal pain associate with nausea and vomiting similar to what she is currently experiencing up until Wednesday of this past week.  She tells me that Wednesday evening, she had dinner, had some relatively sharp right upper quadrant abdominal pain after this.  Lasted several hours, resolved by Thursday.  Thursday evening, again after evening, she had recurrence of her right upper quadrant discomfort, this time associated with nonbloody emesis.  Again improved, though uncertain if it completely resolved as patient went to bed and had pain again Friday a.m..  Friday morning discomfort was more persistent, went to the emergency department for evaluation where she was found to have reassuring  LFTs, though right upper quadrant ultrasound was notable for some sludge in the gallbladder and possible sludge or stone in mid common bile duct.  An MRCP was recommended.  Patient received a GI cocktail, her discomfort improved, and she was discharged from the emergency department with recommendation to follow-up with gastroenterology.    After returning home, pain again recurred, has been present throughout Saturday.  Patient returned again to [facility] emergency department where LFTs are now noted to be elevated.  No fevers, no rigors.    Patient was transferred to [facility] for gastroenterology evaluation and intervention.  Bilirubin went from 1 to 4 over the preceding 24 hours, LFTs also increasing mildly.    Currently she tells me she is somewhat hungry, though has symptoms every time she eats, so is hesitant to do so.  Pain still persists in the right upper quadrant, though remainder of abdomen is spared.  Some loose stools, no diarrhea.  Again, no blood in emesis.  Still has some occasional nausea.    Review of Systems    The 10 point Review of Systems is negative other than noted in the HPI or here.  No fevers, no chills or rigors  No shortness of breath, though does have pain with deep inspiration    Past Medical History    I have reviewed this patient's medical history and updated it with pertinent information if needed.   Past Medical History:   Diagnosis Date     Abnormal Pap smear      Asthma     excercise induced     Herpes simplex without mention of complication      Hx: UTI (urinary tract infection)     requirig ureter surgery     Hypothyroid     sees Dr. Zander Mann in [place]        Past Surgical History   I have reviewed this patient's surgical history and updated it with pertinent information if needed.  Past Surgical History:   Procedure Laterality Date     C/SECTION, LOW TRANSVERSE        SECTION  10/21/2013    Procedure:  SECTION;  REPEAT  SECTION;   Surgeon: Hadley Mejia MD;  Location:  L+D     COLPOSCOPY CERVIX, LOOP ELECTRODE BIOPSY, COMBINED         Social History   I have reviewed this patient's social history and updated it with pertinent information if needed.  Social History     Tobacco Use     Smoking status: Never Smoker     Smokeless tobacco: Not on file   Substance Use Topics     Alcohol use: No            Family History   I have reviewed this patient's family history and updated it with pertinent information if needed.  Family History   Problem Relation Age of Onset     Heart Disease Father    Father's first cardiac events were in his 40s; he was a heavy smoker  Patient unaware of any family history of gallbladder or biliary disease    Prior to Admission Medications   Prior to Admission Medications   Prescriptions Last Dose Informant Patient Reported? Taking?   Levothyroxine Sodium (SYNTHROID PO)   Yes No   Sig: Take 150 mcg by mouth daily    Prenatal Vit-Fe Fumarate-FA (PRENATAL MULTIVITAMIN  PLUS IRON) 27-0.8 MG TABS   Yes No   Sig: Take 1 tablet by mouth daily   ibuprofen (ADVIL,MOTRIN) 400-800 mg tablet   No No   Sig: Take 1-2 tablets (400-800 mg) by mouth every 6 hours as needed (cramping)   oxyCODONE (ROXICODONE) 5 MG immediate release tablet   No No   Sig: Take 1-2 tablets (5-10 mg) by mouth every 3 hours as needed   senna-docusate (SENOKOT-S;PERICOLACE) 8.6-50 MG per tablet   No No   Sig: Take 1-2 tablets by mouth 2 times daily      Facility-Administered Medications: None     Allergies   Allergies   Allergen Reactions     Keflex [Cephalexin Hcl]      Sulfa Drugs        Physical Exam   Vital Signs: Temp: 98.2  F (36.8  C) Temp src: Oral BP: 136/87 Pulse: 71   Resp: 16          General Appearance: Well-appearing 41-year-old female resting on hospital bed.  She does not appear toxic or in acute distress  Eyes: No scleral icterus or injection  HEENT: Normocephalic, atraumatic  Respiratory: Breath sounds are clear bilateral to  auscultation.  With deep inspiration, patient does have right upper quadrant abdominal pain.  Cardiovascular: Regular rate and rhythm.  Heart rate currently in the 70s with no murmur.  GI: Bowel sounds present, though patient with tenderness and guarding in the right upper quadrant.  Left abdomen and suprapubic abdomen spared.  No rebound tenderness.  No palpable mass.  Lymph/Hematologic: No lower extremity edema  Genitourinary: Not examined  Skin: No appreciable jaundice despite bilirubin of 4, no petechiae  Musculoskeletal: Muscular tone and bulk intact in all extremities and appropriate for age.  Neurologic: Alert, conversant, appropriate conversation good mental status grossly intact.  Psychiatric: Very pleasant, normal affect    Data   Data reviewed today: I reviewed all medications, new labs and imaging results over the last 24 hours. I personally reviewed no images or EKG's today.    4/10/2022 CMP with total bilirubin of 4.0, , , alkaline phosphatase of 119    On 4/8/2022 labs, AST was 138, alkaline phosphatase was 91, bilirubin was 1.0    4/10/2022 creatinine of 0.8, BMP generally within normal limits

## 2022-04-10 NOTE — ANESTHESIA CARE TRANSFER NOTE
Patient: Maria Isabel CROWDER Radha    Procedure: Procedure(s):  ENDOSCOPIC RETROGRADE CHOLANGIOPANCREATOGRAPHY, COMPLEX, sphincterotomy, stent placement, stone removal       Diagnosis: Choledocholithiasis [K80.50]  Diagnosis Additional Information: No value filed.    Anesthesia Type:   MAC     Note:    Oropharynx: oropharynx clear of all foreign objects  Level of Consciousness: awake  Oxygen Supplementation: nasal cannula  Level of Supplemental Oxygen (L/min / FiO2): 3  Independent Airway: airway patency satisfactory and stable  Dentition: dentition unchanged  Vital Signs Stable: post-procedure vital signs reviewed and stable  Report to RN Given: handoff report given  Patient transferred to: PACU  Comments: VSS, spontaneously breathing with sats %.  To PACU, monitors on, report to RN.  Handoff Report: Identifed the Patient, Identified the Reponsible Provider, Reviewed the pertinent medical history, Discussed the surgical course, Reviewed Intra-OP anesthesia mangement and issues during anesthesia, Set expectations for post-procedure period and Allowed opportunity for questions and acknowledgement of understanding      Vitals:  Vitals Value Taken Time   BP     Temp     Pulse     Resp     SpO2         Electronically Signed By: CITLALY Mendoza CRNA  April 10, 2022  2:22 PM

## 2022-04-10 NOTE — PROVIDER NOTIFICATION
MD Notification    Notified Person: MD    Notified Person Name: Ramos    Notification Date/Time: 4-9-2022 2320    Notification Interaction: Text Page    Purpose of Notification: [address]    Direct admit from [facility] is in the room.  Thanks Tad LEDESMA RN *[id number]    Orders Received:    Comments:

## 2022-04-10 NOTE — INTERVAL H&P NOTE
I have reviewed the surgical (or preoperative) H&P that is linked to this encounter, and examined the patient. There are no significant changes    Clinical Conditions Present on Arrival:  Clinically Significant Risk Factors Present on Admission               # Hypoalbuminemia: Albumin = 3.0 g/dL (Ref range: 3.4 - 5.0 g/dL) on admission, will monitor as appropriate

## 2022-04-11 ENCOUNTER — ANESTHESIA EVENT (OUTPATIENT)
Dept: SURGERY | Facility: CLINIC | Age: 42
DRG: 419 | End: 2022-04-11
Payer: COMMERCIAL

## 2022-04-11 ENCOUNTER — ANESTHESIA (OUTPATIENT)
Dept: SURGERY | Facility: CLINIC | Age: 42
DRG: 419 | End: 2022-04-11
Payer: COMMERCIAL

## 2022-04-11 LAB
ALBUMIN SERPL-MCNC: 2.9 G/DL (ref 3.4–5)
ALP SERPL-CCNC: 110 U/L (ref 40–150)
ALT SERPL W P-5'-P-CCNC: 162 U/L (ref 0–50)
ANION GAP SERPL CALCULATED.3IONS-SCNC: 3 MMOL/L (ref 3–14)
AST SERPL W P-5'-P-CCNC: 76 U/L (ref 0–45)
BILIRUB SERPL-MCNC: 0.8 MG/DL (ref 0.2–1.3)
BUN SERPL-MCNC: 4 MG/DL (ref 7–30)
CALCIUM SERPL-MCNC: 8.5 MG/DL (ref 8.5–10.1)
CHLORIDE BLD-SCNC: 109 MMOL/L (ref 94–109)
CO2 SERPL-SCNC: 25 MMOL/L (ref 20–32)
CREAT SERPL-MCNC: 0.84 MG/DL (ref 0.52–1.04)
ERYTHROCYTE [DISTWIDTH] IN BLOOD BY AUTOMATED COUNT: 15.5 % (ref 10–15)
GFR SERPL CREATININE-BSD FRML MDRD: 88 ML/MIN/1.73M2
GLUCOSE BLD-MCNC: 110 MG/DL (ref 70–99)
HCG UR QL: NEGATIVE
HCT VFR BLD AUTO: 32.3 % (ref 35–47)
HGB BLD-MCNC: 10.1 G/DL (ref 11.7–15.7)
MCH RBC QN AUTO: 25.4 PG (ref 26.5–33)
MCHC RBC AUTO-ENTMCNC: 31.3 G/DL (ref 31.5–36.5)
MCV RBC AUTO: 81 FL (ref 78–100)
PLATELET # BLD AUTO: 295 10E3/UL (ref 150–450)
POTASSIUM BLD-SCNC: 4.3 MMOL/L (ref 3.4–5.3)
PROT SERPL-MCNC: 6.7 G/DL (ref 6.8–8.8)
RBC # BLD AUTO: 3.98 10E6/UL (ref 3.8–5.2)
SODIUM SERPL-SCNC: 137 MMOL/L (ref 133–144)
WBC # BLD AUTO: 6.5 10E3/UL (ref 4–11)

## 2022-04-11 PROCEDURE — 81025 URINE PREGNANCY TEST: CPT | Performed by: SURGERY

## 2022-04-11 PROCEDURE — 272N000001 HC OR GENERAL SUPPLY STERILE: Performed by: SURGERY

## 2022-04-11 PROCEDURE — 258N000003 HC RX IP 258 OP 636: Performed by: PHYSICIAN ASSISTANT

## 2022-04-11 PROCEDURE — 47562 LAPAROSCOPIC CHOLECYSTECTOMY: CPT | Performed by: SURGERY

## 2022-04-11 PROCEDURE — 258N000003 HC RX IP 258 OP 636: Performed by: ANESTHESIOLOGY

## 2022-04-11 PROCEDURE — 88304 TISSUE EXAM BY PATHOLOGIST: CPT | Mod: TC | Performed by: SURGERY

## 2022-04-11 PROCEDURE — 250N000011 HC RX IP 250 OP 636: Performed by: INTERNAL MEDICINE

## 2022-04-11 PROCEDURE — 250N000025 HC SEVOFLURANE, PER MIN: Performed by: SURGERY

## 2022-04-11 PROCEDURE — 710N000009 HC RECOVERY PHASE 1, LEVEL 1, PER MIN: Performed by: SURGERY

## 2022-04-11 PROCEDURE — 258N000001 HC RX 258: Performed by: SURGERY

## 2022-04-11 PROCEDURE — 250N000009 HC RX 250: Performed by: PHYSICIAN ASSISTANT

## 2022-04-11 PROCEDURE — 250N000009 HC RX 250: Performed by: NURSE ANESTHETIST, CERTIFIED REGISTERED

## 2022-04-11 PROCEDURE — 250N000013 HC RX MED GY IP 250 OP 250 PS 637: Performed by: PHYSICIAN ASSISTANT

## 2022-04-11 PROCEDURE — 250N000013 HC RX MED GY IP 250 OP 250 PS 637: Performed by: HOSPITALIST

## 2022-04-11 PROCEDURE — 47562 LAPAROSCOPIC CHOLECYSTECTOMY: CPT | Mod: AS | Performed by: PHYSICIAN ASSISTANT

## 2022-04-11 PROCEDURE — 99232 SBSQ HOSP IP/OBS MODERATE 35: CPT | Performed by: INTERNAL MEDICINE

## 2022-04-11 PROCEDURE — 85027 COMPLETE CBC AUTOMATED: CPT | Performed by: INTERNAL MEDICINE

## 2022-04-11 PROCEDURE — 360N000076 HC SURGERY LEVEL 3, PER MIN: Performed by: SURGERY

## 2022-04-11 PROCEDURE — 250N000013 HC RX MED GY IP 250 OP 250 PS 637

## 2022-04-11 PROCEDURE — 250N000011 HC RX IP 250 OP 636: Performed by: ANESTHESIOLOGY

## 2022-04-11 PROCEDURE — 370N000017 HC ANESTHESIA TECHNICAL FEE, PER MIN: Performed by: SURGERY

## 2022-04-11 PROCEDURE — 80053 COMPREHEN METABOLIC PANEL: CPT | Performed by: PHYSICIAN ASSISTANT

## 2022-04-11 PROCEDURE — 120N000001 HC R&B MED SURG/OB

## 2022-04-11 PROCEDURE — 999N000141 HC STATISTIC PRE-PROCEDURE NURSING ASSESSMENT: Performed by: SURGERY

## 2022-04-11 PROCEDURE — 36415 COLL VENOUS BLD VENIPUNCTURE: CPT | Performed by: PHYSICIAN ASSISTANT

## 2022-04-11 PROCEDURE — 0FT44ZZ RESECTION OF GALLBLADDER, PERCUTANEOUS ENDOSCOPIC APPROACH: ICD-10-PCS | Performed by: SURGERY

## 2022-04-11 PROCEDURE — 250N000011 HC RX IP 250 OP 636: Performed by: NURSE ANESTHETIST, CERTIFIED REGISTERED

## 2022-04-11 PROCEDURE — 250N000009 HC RX 250: Performed by: SURGERY

## 2022-04-11 RX ORDER — MAGNESIUM HYDROXIDE 1200 MG/15ML
LIQUID ORAL PRN
Status: DISCONTINUED | OUTPATIENT
Start: 2022-04-11 | End: 2022-04-11 | Stop reason: HOSPADM

## 2022-04-11 RX ORDER — FENTANYL CITRATE 0.05 MG/ML
25 INJECTION, SOLUTION INTRAMUSCULAR; INTRAVENOUS EVERY 5 MIN PRN
Status: DISCONTINUED | OUTPATIENT
Start: 2022-04-11 | End: 2022-04-11 | Stop reason: HOSPADM

## 2022-04-11 RX ORDER — CEFAZOLIN SODIUM/WATER 2 G/20 ML
2 SYRINGE (ML) INTRAVENOUS SEE ADMIN INSTRUCTIONS
Status: DISCONTINUED | OUTPATIENT
Start: 2022-04-11 | End: 2022-04-11 | Stop reason: HOSPADM

## 2022-04-11 RX ORDER — ONDANSETRON 2 MG/ML
4 INJECTION INTRAMUSCULAR; INTRAVENOUS EVERY 30 MIN PRN
Status: DISCONTINUED | OUTPATIENT
Start: 2022-04-11 | End: 2022-04-11 | Stop reason: HOSPADM

## 2022-04-11 RX ORDER — HYDROMORPHONE HCL IN WATER/PF 6 MG/30 ML
0.2 PATIENT CONTROLLED ANALGESIA SYRINGE INTRAVENOUS EVERY 5 MIN PRN
Status: DISCONTINUED | OUTPATIENT
Start: 2022-04-11 | End: 2022-04-11 | Stop reason: HOSPADM

## 2022-04-11 RX ORDER — DEXAMETHASONE SODIUM PHOSPHATE 4 MG/ML
INJECTION, SOLUTION INTRA-ARTICULAR; INTRALESIONAL; INTRAMUSCULAR; INTRAVENOUS; SOFT TISSUE PRN
Status: DISCONTINUED | OUTPATIENT
Start: 2022-04-11 | End: 2022-04-11

## 2022-04-11 RX ORDER — OXYCODONE HYDROCHLORIDE 5 MG/1
5 TABLET ORAL EVERY 4 HOURS PRN
Status: DISCONTINUED | OUTPATIENT
Start: 2022-04-11 | End: 2022-04-11 | Stop reason: HOSPADM

## 2022-04-11 RX ORDER — AMOXICILLIN 250 MG
1 CAPSULE ORAL 2 TIMES DAILY PRN
Qty: 20 TABLET | Refills: 0 | Status: SHIPPED | OUTPATIENT
Start: 2022-04-11

## 2022-04-11 RX ORDER — OXYCODONE HYDROCHLORIDE 5 MG/1
5 TABLET ORAL EVERY 4 HOURS PRN
Status: DISCONTINUED | OUTPATIENT
Start: 2022-04-11 | End: 2022-04-12 | Stop reason: HOSPADM

## 2022-04-11 RX ORDER — CEFAZOLIN SODIUM/WATER 2 G/20 ML
2 SYRINGE (ML) INTRAVENOUS
Status: DISCONTINUED | OUTPATIENT
Start: 2022-04-11 | End: 2022-04-11 | Stop reason: HOSPADM

## 2022-04-11 RX ORDER — LIDOCAINE HYDROCHLORIDE 20 MG/ML
INJECTION, SOLUTION INFILTRATION; PERINEURAL PRN
Status: DISCONTINUED | OUTPATIENT
Start: 2022-04-11 | End: 2022-04-11

## 2022-04-11 RX ORDER — SODIUM CHLORIDE, SODIUM LACTATE, POTASSIUM CHLORIDE, CALCIUM CHLORIDE 600; 310; 30; 20 MG/100ML; MG/100ML; MG/100ML; MG/100ML
INJECTION, SOLUTION INTRAVENOUS CONTINUOUS
Status: DISCONTINUED | OUTPATIENT
Start: 2022-04-11 | End: 2022-04-11 | Stop reason: HOSPADM

## 2022-04-11 RX ORDER — OXYCODONE HYDROCHLORIDE 5 MG/1
5 TABLET ORAL EVERY 4 HOURS PRN
Qty: 12 TABLET | Refills: 0 | Status: SHIPPED | OUTPATIENT
Start: 2022-04-11

## 2022-04-11 RX ORDER — FENTANYL CITRATE 50 UG/ML
INJECTION, SOLUTION INTRAMUSCULAR; INTRAVENOUS PRN
Status: DISCONTINUED | OUTPATIENT
Start: 2022-04-11 | End: 2022-04-11

## 2022-04-11 RX ORDER — NEOSTIGMINE METHYLSULFATE 1 MG/ML
VIAL (ML) INJECTION PRN
Status: DISCONTINUED | OUTPATIENT
Start: 2022-04-11 | End: 2022-04-11

## 2022-04-11 RX ORDER — SIMETHICONE 80 MG
80 TABLET,CHEWABLE ORAL EVERY 6 HOURS PRN
Status: DISCONTINUED | OUTPATIENT
Start: 2022-04-11 | End: 2022-04-12 | Stop reason: HOSPADM

## 2022-04-11 RX ORDER — GLYCOPYRROLATE 0.2 MG/ML
INJECTION, SOLUTION INTRAMUSCULAR; INTRAVENOUS PRN
Status: DISCONTINUED | OUTPATIENT
Start: 2022-04-11 | End: 2022-04-11

## 2022-04-11 RX ORDER — ONDANSETRON 2 MG/ML
INJECTION INTRAMUSCULAR; INTRAVENOUS PRN
Status: DISCONTINUED | OUTPATIENT
Start: 2022-04-11 | End: 2022-04-11

## 2022-04-11 RX ORDER — AMOXICILLIN 250 MG
1 CAPSULE ORAL AT BEDTIME
Status: DISCONTINUED | OUTPATIENT
Start: 2022-04-11 | End: 2022-04-12

## 2022-04-11 RX ORDER — LABETALOL HYDROCHLORIDE 5 MG/ML
10 INJECTION, SOLUTION INTRAVENOUS
Status: DISCONTINUED | OUTPATIENT
Start: 2022-04-11 | End: 2022-04-11 | Stop reason: HOSPADM

## 2022-04-11 RX ORDER — ONDANSETRON 4 MG/1
4 TABLET, ORALLY DISINTEGRATING ORAL EVERY 30 MIN PRN
Status: DISCONTINUED | OUTPATIENT
Start: 2022-04-11 | End: 2022-04-11 | Stop reason: HOSPADM

## 2022-04-11 RX ORDER — PROPOFOL 10 MG/ML
INJECTION, EMULSION INTRAVENOUS PRN
Status: DISCONTINUED | OUTPATIENT
Start: 2022-04-11 | End: 2022-04-11

## 2022-04-11 RX ADMIN — NEOSTIGMINE METHYLSULFATE 3.5 MG: 1 INJECTION, SOLUTION INTRAVENOUS at 10:42

## 2022-04-11 RX ADMIN — SODIUM CHLORIDE, POTASSIUM CHLORIDE, SODIUM LACTATE AND CALCIUM CHLORIDE: 600; 310; 30; 20 INJECTION, SOLUTION INTRAVENOUS at 10:45

## 2022-04-11 RX ADMIN — HYDROMORPHONE HYDROCHLORIDE 0.2 MG: 0.2 INJECTION, SOLUTION INTRAMUSCULAR; INTRAVENOUS; SUBCUTANEOUS at 11:15

## 2022-04-11 RX ADMIN — SODIUM CHLORIDE, POTASSIUM CHLORIDE, SODIUM LACTATE AND CALCIUM CHLORIDE: 600; 310; 30; 20 INJECTION, SOLUTION INTRAVENOUS at 09:06

## 2022-04-11 RX ADMIN — FAMOTIDINE 20 MG: 10 INJECTION, SOLUTION INTRAVENOUS at 08:00

## 2022-04-11 RX ADMIN — GLYCOPYRROLATE 0.6 MG: 0.2 INJECTION, SOLUTION INTRAMUSCULAR; INTRAVENOUS at 10:42

## 2022-04-11 RX ADMIN — HYDROMORPHONE HYDROCHLORIDE 0.5 MG: 1 INJECTION, SOLUTION INTRAMUSCULAR; INTRAVENOUS; SUBCUTANEOUS at 10:15

## 2022-04-11 RX ADMIN — ONDANSETRON 4 MG: 2 INJECTION INTRAMUSCULAR; INTRAVENOUS at 10:15

## 2022-04-11 RX ADMIN — HYDROMORPHONE HYDROCHLORIDE 0.2 MG: 0.2 INJECTION, SOLUTION INTRAMUSCULAR; INTRAVENOUS; SUBCUTANEOUS at 11:33

## 2022-04-11 RX ADMIN — LIDOCAINE HYDROCHLORIDE 100 MG: 20 INJECTION, SOLUTION INFILTRATION; PERINEURAL at 09:54

## 2022-04-11 RX ADMIN — MIDAZOLAM 2 MG: 1 INJECTION INTRAMUSCULAR; INTRAVENOUS at 09:47

## 2022-04-11 RX ADMIN — DEXAMETHASONE SODIUM PHOSPHATE 4 MG: 4 INJECTION, SOLUTION INTRA-ARTICULAR; INTRALESIONAL; INTRAMUSCULAR; INTRAVENOUS; SOFT TISSUE at 10:00

## 2022-04-11 RX ADMIN — OXYCODONE HYDROCHLORIDE 5 MG: 5 TABLET ORAL at 21:11

## 2022-04-11 RX ADMIN — PROPOFOL 150 MG: 10 INJECTION, EMULSION INTRAVENOUS at 09:54

## 2022-04-11 RX ADMIN — FAMOTIDINE 20 MG: 10 INJECTION, SOLUTION INTRAVENOUS at 21:00

## 2022-04-11 RX ADMIN — ROCURONIUM BROMIDE 40 MG: 50 INJECTION, SOLUTION INTRAVENOUS at 09:54

## 2022-04-11 RX ADMIN — FENTANYL CITRATE 50 MCG: 50 INJECTION, SOLUTION INTRAMUSCULAR; INTRAVENOUS at 09:54

## 2022-04-11 RX ADMIN — HYDROMORPHONE HYDROCHLORIDE 0.2 MG: 0.2 INJECTION, SOLUTION INTRAMUSCULAR; INTRAVENOUS; SUBCUTANEOUS at 11:22

## 2022-04-11 RX ADMIN — POTASSIUM CHLORIDE, DEXTROSE MONOHYDRATE AND SODIUM CHLORIDE: 150; 5; 450 INJECTION, SOLUTION INTRAVENOUS at 22:57

## 2022-04-11 RX ADMIN — LEVOTHYROXINE SODIUM 200 MCG: 100 TABLET ORAL at 08:16

## 2022-04-11 RX ADMIN — FENTANYL CITRATE 50 MCG: 50 INJECTION, SOLUTION INTRAMUSCULAR; INTRAVENOUS at 09:47

## 2022-04-11 RX ADMIN — CEFTRIAXONE SODIUM 2 G: 2 INJECTION, POWDER, FOR SOLUTION INTRAMUSCULAR; INTRAVENOUS at 08:00

## 2022-04-11 RX ADMIN — SIMETHICONE 80 MG: 80 TABLET, CHEWABLE ORAL at 19:44

## 2022-04-11 RX ADMIN — POTASSIUM CHLORIDE, DEXTROSE MONOHYDRATE AND SODIUM CHLORIDE: 150; 5; 450 INJECTION, SOLUTION INTRAVENOUS at 12:09

## 2022-04-11 RX ADMIN — SENNOSIDES AND DOCUSATE SODIUM 1 TABLET: 50; 8.6 TABLET ORAL at 21:11

## 2022-04-11 RX ADMIN — POTASSIUM CHLORIDE, DEXTROSE MONOHYDRATE AND SODIUM CHLORIDE: 150; 5; 450 INJECTION, SOLUTION INTRAVENOUS at 02:58

## 2022-04-11 ASSESSMENT — ACTIVITIES OF DAILY LIVING (ADL)
ADLS_ACUITY_SCORE: 6
ADLS_ACUITY_SCORE: 6
ADLS_ACUITY_SCORE: 4
ADLS_ACUITY_SCORE: 6
ADLS_ACUITY_SCORE: 4
ADLS_ACUITY_SCORE: 6
ADLS_ACUITY_SCORE: 4
ADLS_ACUITY_SCORE: 6

## 2022-04-11 NOTE — PROGRESS NOTES
[facility]    Medicine Progress Note - Hospitalist Service    Date of Admission:  4/9/2022    Assessment & Plan        Maria Isabel Garcia is a 41 year old female admitted on 4/9/2022. She presents as a direct admission from [facility] emergency department with persistent abdominal pain, nausea and vomiting worsened with eating, increasing LFTs, recently performed right upper quadrant abdominal ultrasound with CBD defect concerning for choledocholithiasis     Choledocholithiasis:  Biliary colic  Cholelithiasis   Patient with right upper quadrant pain, postprandial discomfort and nausea and vomiting since Wednesday, more persistent since Friday night.  Increasing LFTs and 4/7/2022 ultrasound at outside facility with mid CBD defect concerning for sludge/choledocholithiasis  -Appreciate GI input,s/p EGD and EUS 4/10.  --Appreciate surgery input, s/p cholecystectomy 4/11  -Patient has been started on ceftriaxone 2 g daily, not sure patient requires this since there was no sign of cholecystitis. Will d/w surgery team     Hypothyroidism:  -Continue levothyroxine 150 mcg daily     Diet: Advance Diet as Tolerated: Clear Liquid Diet    DVT Prophylaxis: Pneumatic Compression Devices  Solomon Catheter: Not present  Central Lines: None  Cardiac Monitoring: None  Code Status: Full Code      Disposition Plan   Expected Discharge: 04/11/2022     Anticipated discharge location: home with family    Delays:         The patient's care was discussed with the Bedside Nurse, Patient and Patient's Family.    Dewayne Pinzon MD, MD  Hospitalist Service  [facility]  Securely message with the [employer]oc[employer] Web Console (learn more here)  Text page via [employer] Paging/Directory     Clinically Significant Risk Factors Present on Admission               ______________________________________________________________________    Interval History   Seen s/p Surgery.   Pain 2/10. Denies nausea  Feels ok.   4 point ROS  "done and neg unless mentioned    Data reviewed today: I reviewed all medications, new labs and imaging results over the last 24 hours. I personally reviewed no images or EKG's today.    Physical Exam   /74 (BP Location: Left leg, Patient Position: Semi-Amaral's)   Pulse 61   Temp 97.8  F (36.6  C) (Oral)   Resp 15   Ht 1.676 m (5' 6\")   Wt 67 kg (147 lb 11.2 oz)   LMP 04/11/2022   SpO2 99%   BMI 23.84 kg/m    Gen- pleasant lying in bed  HEENT- NAD, KAYLIE  Neck- supple, no JVD elevation, no thyromegaly  CVS- I+II+ no m/r/g  RS- CTAB  Abdo- soft, no tenderness. No g/r/r.   Ext- no edema     Data   No results found for this or any previous visit (from the past 24 hour(s)).   BMPRecent Labs   Lab 04/11/22  0638 04/10/22  1636 04/10/22  0755 04/10/22  0606     --   --  139   POTASSIUM 4.3 4.2 3.4 3.3*   CHLORIDE 109  --   --  109   HARLEY 8.5  --   --  8.3*   CO2 25  --   --  23   BUN 4*  --   --  6*   CR 0.84  --   --  0.71   *  --   --  120*     CBC  Recent Labs   Lab 04/11/22  0638   WBC 6.5   RBC 3.98   HGB 10.1*   HCT 32.3*   MCV 81   MCH 25.4*   MCHC 31.3*   RDW 15.5*        INR  Recent Labs   Lab 04/10/22  0606   INR 0.99     LFTs  Recent Labs   Lab 04/11/22  0638 04/10/22  0606   ALKPHOS 110 113   AST 76* 140*   * 193*   BILITOTAL 0.8 3.0*   PROTTOTAL 6.7* 6.9   ALBUMIN 2.9* 3.0*      PANCNo lab results found in last 7 days.    "

## 2022-04-11 NOTE — PLAN OF CARE
Goal Outcome Evaluation:  Alert and oriented X4, Vss on room air independent in the room.  Pt come form ERCP at 1600, iv dextrous 5 and 0.45 NaCl +kcl infusing on  R arm.  Pt is on clear liquid and will be npo after midnight for gall bladder remover tomorrow. Pain controled with oxycodone.

## 2022-04-11 NOTE — PROVIDER NOTIFICATION
MD Notification    Notified Person: Glen Burt PA-C    Notification Date/Time: 13:30    Notification Interaction: Pager/telephone    Purpose of Notification: no diet orders were placed    Orders Received: Advance diet as tolerated. Telephone order with read back.    Comments:

## 2022-04-11 NOTE — ANESTHESIA PREPROCEDURE EVALUATION
Anesthesia Pre-Procedure Evaluation    Patient: Maria Isabel Garcia   MRN: [id number] : 1980        Procedure : Procedure(s):  CHOLECYSTECTOMY, LAPAROSCOPIC          Past Medical History:   Diagnosis Date     Abnormal Pap smear      Asthma     excercise induced     Herpes simplex without mention of complication      Hx: UTI (urinary tract infection)     requirig ureter surgery     Hypothyroid     sees Dr. Zander Mann in [place]      Past Surgical History:   Procedure Laterality Date     C/SECTION, LOW TRANSVERSE        SECTION  10/21/2013    Procedure:  SECTION;  REPEAT  SECTION;  Surgeon: Hadley Mejia MD;  Location:  L+D     COLPOSCOPY CERVIX, LOOP ELECTRODE BIOPSY, COMBINED        Allergies   Allergen Reactions     Keflex [Cephalexin Hcl]      rash     Sulfa Drugs      Shortness of breath      Social History     Tobacco Use     Smoking status: Never Smoker     Smokeless tobacco: Not on file   Substance Use Topics     Alcohol use: No      Wt Readings from Last 1 Encounters:   04/10/22 67 kg (147 lb 11.2 oz)        Anesthesia Evaluation            ROS/MED HX  ENT/Pulmonary:     (+) asthma  (-) sleep apnea   Neurologic:       Cardiovascular:       METS/Exercise Tolerance:     Hematologic:       Musculoskeletal:       GI/Hepatic:     (+) cholecystitis/cholelithiasis,  (-) GERD   Renal/Genitourinary:       Endo:     (+) thyroid problem, hypothyroidism,     Psychiatric/Substance Use:       Infectious Disease:       Malignancy:       Other:            Physical Exam    Airway        Mallampati: II   TM distance: > 3 FB   Neck ROM: full   Mouth opening: > 3 cm    Respiratory Devices and Support         Dental  no notable dental history         Cardiovascular   cardiovascular exam normal          Pulmonary   pulmonary exam normal                OUTSIDE LABS:  CBC:   Lab Results   Component Value Date    WBC 6.5 2022    HGB 10.1 (L) 2022    HGB 10.3 (L) 10/22/2013    HCT 32.3  (L) 04/11/2022    HCT 38.3 03/14/2013     04/11/2022     K 03/14/2013     BMP:   Lab Results   Component Value Date     04/11/2022     04/10/2022    POTASSIUM 4.3 04/11/2022    POTASSIUM 4.2 04/10/2022    CHLORIDE 109 04/11/2022    CHLORIDE 109 04/10/2022    CO2 25 04/11/2022    CO2 23 04/10/2022    BUN 4 (L) 04/11/2022    BUN 6 (L) 04/10/2022    CR 0.84 04/11/2022    CR 0.71 04/10/2022     (H) 04/11/2022     (H) 04/10/2022     COAGS:   Lab Results   Component Value Date    INR 0.99 04/10/2022     POC: No results found for: BGM, HCG, HCGS  HEPATIC:   Lab Results   Component Value Date    ALBUMIN 2.9 (L) 04/11/2022    PROTTOTAL 6.7 (L) 04/11/2022     (H) 04/11/2022    AST 76 (H) 04/11/2022    ALKPHOS 110 04/11/2022    BILITOTAL 0.8 04/11/2022     OTHER:   Lab Results   Component Value Date    HARLEY 8.5 04/11/2022       Anesthesia Plan    ASA Status:  2   NPO Status:  NPO Appropriate    Anesthesia Type: General.     - Airway: ETT   Induction: Intravenous.   Maintenance: Balanced.        Consents    Anesthesia Plan(s) and associated risks, benefits, and realistic alternatives discussed. Questions answered and patient/representative(s) expressed understanding.    - Discussed:     - Discussed with:  Patient         Postoperative Care    Pain management: IV analgesics.   PONV prophylaxis: Ondansetron (or other 5HT-3), Dexamethasone or Solumedrol     Comments:                LAURIE PHELAN MD

## 2022-04-11 NOTE — PROGRESS NOTES
Patient in OR  Chart check   LFTS trending down  EUS findings consistent with multiple CBD stones and moderate to large amount of sludge in the common bile duct.  ERCP findings consistent with multiple CBD stones and sludge.  Sphincterotomy was done CBD stent was placed and also pancreatic duct stent was placed.  Cholecystectomy today  Repeat ERCP in 3 months for stent removal.  Will see patient tomorrow.    Anisa Cabrrea PA-C  [facility]  [phone]

## 2022-04-11 NOTE — ANESTHESIA CARE TRANSFER NOTE
Patient: Maria Isabel Garcia    Procedure: Procedure(s):  CHOLECYSTECTOMY, LAPAROSCOPIC       Diagnosis: Choledocholithiasis [K80.50]  Diagnosis Additional Information: No value filed.    Anesthesia Type:   General     Note:    Oropharynx: oropharynx clear of all foreign objects  Level of Consciousness: awake  Oxygen Supplementation: face mask    Independent Airway: airway patency satisfactory and stable  Dentition: dentition unchanged  Vital Signs Stable: post-procedure vital signs reviewed and stable  Report to RN Given: handoff report given  Patient transferred to: PACU    Handoff Report: Identifed the Patient, Identified the Reponsible Provider, Reviewed the pertinent medical history, Discussed the surgical course, Reviewed Intra-OP anesthesia mangement and issues during anesthesia, Set expectations for post-procedure period and Allowed opportunity for questions and acknowledgement of understanding      Vitals:  Vitals Value Taken Time   /81    Temp     Pulse 67    Resp 12    SpO2 100        Electronically Signed By: CITLALY Rose CRNA  April 11, 2022  11:04 AM

## 2022-04-11 NOTE — PLAN OF CARE
Goal Outcome Evaluation:    Plan of Care Reviewed With: patient             AOx4, VSS on RA, c/o some upper abd discomfort. IND in rm. NPO for procedure today.

## 2022-04-11 NOTE — ANESTHESIA PROCEDURE NOTES
Airway       Patient location during procedure: OR       Procedure Start/Stop Times: 4/11/2022 10:00 AM  Staff -        Anesthesiologist:  Jw Greenfield MD       CRNA: Christy Winslow APRN CRNA       Performed By: CRNAIndications and Patient Condition       Indications for airway management: sathya-procedural       Induction type:intravenous       Mask difficulty assessment: 1 - vent by mask    Final Airway Details       Final airway type: endotracheal airway       Successful airway: ETT - single  Endotracheal Airway Details        ETT size (mm): 7.0       Cuffed: yes       Successful intubation technique: direct laryngoscopy       DL Blade Type: Hammer 2       Adjucts: stylet       Position: Right       Measured from: gums/teeth       Secured at (cm): 21       Bite block used: None    Post intubation assessment        Placement verified by: capnometry, equal breath sounds and chest rise        Number of attempts at approach: 1       Secured with: pink tape       Ease of procedure: easy       Dentition: Intact and Unchanged    Medication(s) Administered   Medication Administration Time: 4/11/2022 10:00 AM

## 2022-04-11 NOTE — ANESTHESIA POSTPROCEDURE EVALUATION
Patient: Maria Isabel Garcia    Procedure: Procedure(s):  CHOLECYSTECTOMY, LAPAROSCOPIC       Anesthesia Type:  General    Note:  Disposition: Inpatient   Postop Pain Control: Uneventful            Sign Out: Well controlled pain   PONV: No   Neuro/Psych: Uneventful            Sign Out: Acceptable/Baseline neuro status   Airway/Respiratory: Uneventful            Sign Out: Acceptable/Baseline resp. status   CV/Hemodynamics: Uneventful            Sign Out: Acceptable CV status; No obvious hypovolemia; No obvious fluid overload   Other NRE: NONE   DID A NON-ROUTINE EVENT OCCUR? No           Last vitals:  Vitals Value Taken Time   /83 04/11/22 1130   Temp 36.9  C (98.5  F) 04/11/22 1103   Pulse 55 04/11/22 1137   Resp 9 04/11/22 1137   SpO2 97 % 04/11/22 1137   Vitals shown include unvalidated device data.    Electronically Signed By: LAURIE PHELAN MD  April 11, 2022  11:39 AM

## 2022-04-11 NOTE — OP NOTE
General Surgery Operative Note    PREOPERATIVE DIAGNOSIS:  cholecystitis.    POSTOPERATIVE DIAGNOSIS:  cholecystitis.    PROCEDURE:  Laparoscopic Cholecystectomy    SURGEON:  Pamela Desir MD    ASSISTANT:  Glen Burt PA-C  The physician s assistant was medically necessary for their expertise in camera management, suctioning and retraction.  ANESTHESIA:  General.    BLOOD LOSS: 5ml    FINDINGS: omental adhesions to gallbladder wall with wall edema and thickening consistent with cholecystitis    INDICATIONS:   Mrs. Garcia is a 40 yo female who presented with abdominal pain. She was found to have choledocholithiasis and underwent ERCP and is now presenting for laparoscopic cholecystectomy.  I explained the risks, benefits, complications for laparoscopic cholecystectomy including but not limited to bleeding, infection, possible need to open, possible postop hematoma, seroma, bowel, bladder or bile duct injury, MI, PE, and if any of these occurred the patient would require additional procedures. The patient agreed and did sign consent.    DETAILS OF PROCEDURE:   The patient was brought to the operating room. They were positioned on the operating room table with the left arm tucked at their side. General anesthesia was induced. Perioperative antibiotics were administered. The abdomen was prepped and draped in standard fashion.    A small skin incision was made in the left upper quadrant. A 5mm 0degree laparoscope was used with a visiport to obtain access to the abdomen. Insufflation was connected and flowed freely. Insufflation pressure was sent to 15mmhg. The abdomen was surveyed and revealed mild inflammation in the right upper abdomen.  There was no injury from abdominal entrance noted. A 12mm port was placed in the midline just beneath the umbilicus. A 5mm 30 degree laparoscope was inserted and revealed no trocar injuries. Local was injected to the upper abdomen and two 5mm ports were placed in the right upper  quadrant under direct visualization. The gallbladder was retracted superiorly and laterally. The gallbladder wall was mildly edematous and thickened. There were omental adhesions to the gallbladder wall which were carefully taken down with cautery. Laparoscopic scissors was used to divide adhesions in close proximity to the duodenum and the duodenum was carefully swept clear of the infundibulum. Cautery was then used to take down the medial and lateral peritoneal attachments. I was able to delineate the cystic duct and got under the undersurface of the gallbladder to help further declinate the duct. There was an artery traveling posteriorly with a small branch traveling onto the gallbladder which was divided with cautery. I continued the dissection high along the gallbladder to confirm the critical view on multiple views. Two clips were placed proximally on the cystic duct and one distally. Thd cystic duct was enlarged at 6mm.the duct was transected with laparoscopic scissors. I elected to reinforce the duct with an 0 PDS endoloop suture.     The gallbladder was taken off the liver bed with cautery. There was no bile spillage or significant bleeding. The gallbladder was then placed in an Endocatch bag and removed through the umbilical trocar site. The camera was reinserted which showed no bleeding or bile spillage. Copious irrigation was used until clear. The wound bed was inspected multiple times showing that it was completely dry and that the clips were in place. The omentum was packed into the perihepatic fossa. The 12mm port fascia closed with 0 Vicryl in figure-of-eight fashion using the los skyler device. All gas was expelled and the trocars were taken out under direct visualization. Marcaine 0.5% were injected to all the wounds. The skin was closed with a 4-0 Monocryl in a subcuticular fashion. Steri-strips and sterile dressings were applied. The patient tolerated the procedure well. There were no  complications. They were awoken from anesthesia and transferred to PACU in stable condition. All sponge, instrument and needle counts were correct.       LORRAINE MCGHEE MD    Please route or send letter to:  Primary Care Provider (PCP) and Referring Provider

## 2022-04-11 NOTE — DISCHARGE INSTRUCTIONS
[facility]  Discharge Instructions: Post-Operative Laparoscopic Cholecystectomy    ACTIVITY  Expect to feel tired after your surgery.  This will gradually resolve.    Take frequent, short walks and increase your activity gradually.    Avoid strenuous physical activity or heavy lifting greater than 15-20 lbs. for 2-3 weeks.  You may climb stairs.  You may drive without restrictions when you are not using any prescription pain medication and feel comfortable in a car.  You may return to work/school when you are comfortable without any prescription pain medication.    WOUND CARE  You may remove your outer dressing or Band-Aids and shower 48 hours after the surgery.  Pat your incisions dry and leave them open to air.  Re-apply dressing (Band-Aids or gauze/tape) as needed for comfort or drainage.  You may have steri-strips (looks like white tape) on your incision.  You may peel off the steri-strips 2 weeks after your surgery if they have not peeled off on their own.   Do not soak your incisions in a tub or pool for 2 weeks.   Do not apply any lotions, creams, or ointments to your incisions.  A ridge under your incisions is normal and will gradually resolve.    DIET  Start with liquids, then gradually resume your regular diet as tolerated.  Avoid heavy, spicy, and greasy meals for 2-3 days.  Drink plenty of fluids to stay hydrated.  It is not uncommon to experience some loose stools or diarrhea after surgery.  This is your body's way of adapting to the bile which will slowly drain into your intestine.  A low fat diet may help with this.  This should improve over 1-2 months.    PAIN  Expect some tenderness and discomfort at the incision sites.  Use the prescribed pain medication at your discretion.  Expect gradual resolution of your pain over several days.  You may take ibuprofen with food (unless you have been told not to) or acetaminophen/Tylenol instead of or in addition to your prescribed  pain medication.  If you are taking Norco or Percocet, do not take any additional acetaminophen/Tylenol.  Do not drink alcohol or drive while you are taking pain medications.  You may apply ice to your incisions in 20 minute intervals as needed for the next 48 hours.  After that time, consider switching to heat if you prefer.    EXPECTATIONS  Pain medications can cause constipation.  Limit use when possible.  Take over the counter stool softener/stimulant, such as Colace or Senna, 1-2 times a day with plenty of water.  You may take a mild over the counter laxative, such as Miralax or a suppository, as needed.  You may discontinue these medications once you are having regular bowel movements and/or are no longer taking your narcotic pain medication.    You may have shoulder or upper back discomfort due to the gas used in surgery.  This is temporary and should resolve in 48-72 hours.  Short, frequent walks may help with this.  If you are unable to urinate, or feel as though you are not emptying your bladder adequately, we recommend you call our office and/or seek care at an ER or Urgent Care facility if after hours.    FOLLOW UP  Our office will contact you in approximately 2 weeks to check on your progress and answer any questions you may have.  If you are doing well, you will not need to return for a follow up appointment.  If any concerns are identified over the phone, we will help you make an appointment to see a provider.   If you have not received a phone call, have any questions or concerns, or would like to be seen, please call us at [phone] and ask to speak with our nurse.  We are located at [address] [place] [address].    CALL OUR OFFICE -[phone] IF YOU HAVE:   Chills or fever above 101 F.  Increased redness, warmth, or drainage at your incisions.  Significant bleeding.  Pain not relieved by your pain medication or rest.  Increasing pain after the first 48 hours.  Any  other concerns or questions.

## 2022-04-12 ENCOUNTER — APPOINTMENT (OUTPATIENT)
Dept: ULTRASOUND IMAGING | Facility: CLINIC | Age: 42
DRG: 419 | End: 2022-04-12
Attending: INTERNAL MEDICINE
Payer: COMMERCIAL

## 2022-04-12 VITALS
RESPIRATION RATE: 16 BRPM | TEMPERATURE: 97.9 F | WEIGHT: 147.7 LBS | HEIGHT: 66 IN | DIASTOLIC BLOOD PRESSURE: 79 MMHG | BODY MASS INDEX: 23.74 KG/M2 | OXYGEN SATURATION: 100 % | SYSTOLIC BLOOD PRESSURE: 128 MMHG | HEART RATE: 64 BPM

## 2022-04-12 LAB
ALBUMIN SERPL-MCNC: 2.6 G/DL (ref 3.4–5)
ALP SERPL-CCNC: 98 U/L (ref 40–150)
ALT SERPL W P-5'-P-CCNC: 127 U/L (ref 0–50)
ANION GAP SERPL CALCULATED.3IONS-SCNC: 3 MMOL/L (ref 3–14)
AST SERPL W P-5'-P-CCNC: 50 U/L (ref 0–45)
BILIRUB SERPL-MCNC: 0.5 MG/DL (ref 0.2–1.3)
BUN SERPL-MCNC: 4 MG/DL (ref 7–30)
CALCIUM SERPL-MCNC: 8.1 MG/DL (ref 8.5–10.1)
CHLORIDE BLD-SCNC: 108 MMOL/L (ref 94–109)
CO2 SERPL-SCNC: 26 MMOL/L (ref 20–32)
CREAT SERPL-MCNC: 0.81 MG/DL (ref 0.52–1.04)
ERYTHROCYTE [DISTWIDTH] IN BLOOD BY AUTOMATED COUNT: 15.4 % (ref 10–15)
GFR SERPL CREATININE-BSD FRML MDRD: >90 ML/MIN/1.73M2
GLUCOSE BLD-MCNC: 129 MG/DL (ref 70–99)
HCT VFR BLD AUTO: 32 % (ref 35–47)
HGB BLD-MCNC: 9.8 G/DL (ref 11.7–15.7)
MCH RBC QN AUTO: 25.3 PG (ref 26.5–33)
MCHC RBC AUTO-ENTMCNC: 30.6 G/DL (ref 31.5–36.5)
MCV RBC AUTO: 83 FL (ref 78–100)
PLATELET # BLD AUTO: 299 10E3/UL (ref 150–450)
POTASSIUM BLD-SCNC: 3.8 MMOL/L (ref 3.4–5.3)
PROT SERPL-MCNC: 6.4 G/DL (ref 6.8–8.8)
RBC # BLD AUTO: 3.87 10E6/UL (ref 3.8–5.2)
SODIUM SERPL-SCNC: 137 MMOL/L (ref 133–144)
WBC # BLD AUTO: 10.1 10E3/UL (ref 4–11)

## 2022-04-12 PROCEDURE — 250N000011 HC RX IP 250 OP 636: Performed by: PHYSICIAN ASSISTANT

## 2022-04-12 PROCEDURE — 250N000009 HC RX 250: Performed by: PHYSICIAN ASSISTANT

## 2022-04-12 PROCEDURE — 93971 EXTREMITY STUDY: CPT | Mod: RT

## 2022-04-12 PROCEDURE — 250N000013 HC RX MED GY IP 250 OP 250 PS 637: Performed by: INTERNAL MEDICINE

## 2022-04-12 PROCEDURE — 85027 COMPLETE CBC AUTOMATED: CPT | Performed by: INTERNAL MEDICINE

## 2022-04-12 PROCEDURE — 250N000013 HC RX MED GY IP 250 OP 250 PS 637: Performed by: PHYSICIAN ASSISTANT

## 2022-04-12 PROCEDURE — 80053 COMPREHEN METABOLIC PANEL: CPT | Performed by: INTERNAL MEDICINE

## 2022-04-12 PROCEDURE — 250N000013 HC RX MED GY IP 250 OP 250 PS 637: Performed by: SURGERY

## 2022-04-12 PROCEDURE — 36415 COLL VENOUS BLD VENIPUNCTURE: CPT | Performed by: INTERNAL MEDICINE

## 2022-04-12 PROCEDURE — 250N000013 HC RX MED GY IP 250 OP 250 PS 637: Performed by: HOSPITALIST

## 2022-04-12 PROCEDURE — 99239 HOSP IP/OBS DSCHRG MGMT >30: CPT | Performed by: INTERNAL MEDICINE

## 2022-04-12 RX ORDER — AMOXICILLIN 250 MG
2 CAPSULE ORAL 2 TIMES DAILY
Status: DISCONTINUED | OUTPATIENT
Start: 2022-04-12 | End: 2022-04-12 | Stop reason: HOSPADM

## 2022-04-12 RX ORDER — IBUPROFEN 600 MG/1
600 TABLET, FILM COATED ORAL EVERY 6 HOURS PRN
Status: DISCONTINUED | OUTPATIENT
Start: 2022-04-12 | End: 2022-04-12 | Stop reason: HOSPADM

## 2022-04-12 RX ORDER — POLYETHYLENE GLYCOL 3350 17 G/17G
17 POWDER, FOR SOLUTION ORAL DAILY
Status: DISCONTINUED | OUTPATIENT
Start: 2022-04-12 | End: 2022-04-12 | Stop reason: HOSPADM

## 2022-04-12 RX ADMIN — POLYETHYLENE GLYCOL 3350 17 G: 17 POWDER, FOR SOLUTION ORAL at 09:28

## 2022-04-12 RX ADMIN — OXYCODONE HYDROCHLORIDE 5 MG: 5 TABLET ORAL at 02:20

## 2022-04-12 RX ADMIN — IBUPROFEN 600 MG: 600 TABLET ORAL at 12:01

## 2022-04-12 RX ADMIN — CEFTRIAXONE SODIUM 2 G: 2 INJECTION, POWDER, FOR SOLUTION INTRAMUSCULAR; INTRAVENOUS at 09:27

## 2022-04-12 RX ADMIN — FAMOTIDINE 20 MG: 10 INJECTION, SOLUTION INTRAVENOUS at 09:07

## 2022-04-12 RX ADMIN — SIMETHICONE 80 MG: 80 TABLET, CHEWABLE ORAL at 02:18

## 2022-04-12 RX ADMIN — LEVOTHYROXINE SODIUM 200 MCG: 100 TABLET ORAL at 09:27

## 2022-04-12 RX ADMIN — SIMETHICONE 80 MG: 80 TABLET, CHEWABLE ORAL at 09:08

## 2022-04-12 RX ADMIN — SENNOSIDES AND DOCUSATE SODIUM 2 TABLET: 50; 8.6 TABLET ORAL at 09:28

## 2022-04-12 ASSESSMENT — ACTIVITIES OF DAILY LIVING (ADL)
ADLS_ACUITY_SCORE: 6
ADLS_ACUITY_SCORE: 4
ADLS_ACUITY_SCORE: 4
ADLS_ACUITY_SCORE: 6
ADLS_ACUITY_SCORE: 6
ADLS_ACUITY_SCORE: 4
ADLS_ACUITY_SCORE: 6
ADLS_ACUITY_SCORE: 6

## 2022-04-12 NOTE — PROGRESS NOTES
"Surgery    No complaints except gas pain  Pain controlled  + bowel function   Tolerating diet  Walking   Denies CP, dyspnea    Gen:  Awake, Alert, NAD  Blood pressure 138/88, pulse 84, temperature 98  F (36.7  C), temperature source Oral, resp. rate 16, height 1.676 m (5' 6\"), weight 67 kg (147 lb 11.2 oz), last menstrual period 04/11/2022, SpO2 97 %  Resp - Non-labored, clear to ascultation.    Cardiac - Regular rate & rhythm without murmur  Abdomen - soft, appropriately tender, non distended. Incisions healing appropriately without erythema or drainage.  Extremities - no lower extremity edema or tenderness with palpation    A/P s/p ERCP with bile duct stent 4/10/22, laparoscopic cholecystectomy on 4/11/22.    - ADAT  - encourage incentive spirometer use  - ambulate  - dispo: Home today per hospitalist  - instructed  - surgery team will follow up in about 2 weeks with phone call    Glen Burt PA-C  Office: [phone]  Pager: [phone]    "

## 2022-04-12 NOTE — DISCHARGE SUMMARY
[facility]  Hospitalist Discharge Summary      Date of Admission:  4/9/2022  Date of Discharge:  4/12/2022  Discharging Provider: Dewayne Pinzon MD, MD  Discharge Service: Hospitalist Service    Discharge Diagnoses     Choledocholithiasis:  Biliary colic  Cholelithiasis  s/p EGD and EUS 4/10.  s/p cholecystectomy 4/11    Hypothyroidism:    Follow-ups Needed After Discharge   Follow-up Appointments     Follow-up and recommended labs and tests       Follow up with primary care provider, Hadley Mejia MD, within 7   days for hospital follow- up.  The following labs/tests are recommended:   CBC, CMP.      Follow up with Liane Menchaca/ GI for f/u in 1-2 week and Repeat ERCP in 3   months for stent removal.    - surgery team will follow up in about 2 weeks with phone call         {  Unresulted Labs Ordered in the Past 30 Days of this Admission     Date and Time Order Name Status Description    4/11/2022 10:32 AM Surgical Pathology Exam In process       These results will be followed up by surgery team    Discharge Disposition   Discharged to home  Condition at discharge: Stable    Hospital Course   Maria Isabel Garcia is a 41 year old female admitted on 4/9/2022. She presents as a direct admission from [facility] emergency department with persistent abdominal pain, nausea and vomiting worsened with eating, increasing LFTs, recently performed right upper quadrant abdominal ultrasound with CBD defect concerning for choledocholithiasis     Choledocholithiasis:  Biliary colic  Cholelithiasis   Patient with right upper quadrant pain, postprandial discomfort and nausea and vomiting since Wednesday, more persistent since Friday night.  Increasing LFTs and 4/7/2022 ultrasound at outside facility with mid CBD defect concerning for sludge/choledocholithiasis  -Appreciate GI input,s/p EGD and EUS 4/10.  --Appreciate surgery input, s/p cholecystectomy 4/11  Her postop course was uncomplicated and she was  discharged home to closely follow-up with GI and general surgery as outpatient.     Hypothyroidism:  -Continued levothyroxine 150 mcg daily     Consultations This Hospital Stay   GASTROENTEROLOGY IP CONSULT  SURGERY GENERAL IP CONSULT    Code Status   Full Code    Time Spent on this Encounter   IDewayne MD, MD, personally saw the patient today and spent greater than 30 minutes discharging this patient.       Dewayne Pinzon MD, MD  [facility]  [address] [facility] [address]  Phone: [phone]  Fax: [phone]  ______________________________________________________________________    Physical Exam   Vital Signs: Temp: 97.9  F (36.6  C) Temp src: Oral BP: 128/79 Pulse: 64   Resp: 16 SpO2: 100 % O2 Device: None (Room air)    Weight: 147 lbs 11.2 oz  Gen- pleasant lying in bed  HEENT- NAD, KAYLIE  Neck- supple, no JVD elevation, no thyromegaly  CVS- I+II+ no m/r/g  RS- CTAB  Abdo- soft, no tenderness. No g/r/r.   Ext- no edema   Primary Care Physician   Hadley Mejia MD    Discharge Orders      Reason for your hospital stay    Maria Isabel Garcia is a 41 year old female admitted on 4/9/2022. She presents as a direct admission from [facility] emergency department with persistent abdominal pain, nausea and vomiting worsened with eating, increasing LFTs, recently performed right upper quadrant abdominal ultrasound with CBD defect concerning for choledocholithiasis     Follow-up and recommended labs and tests     Follow up with primary care provider, Hadley Mejia MD, within 7 days for hospital follow- up.  The following labs/tests are recommended: CBC, CMP.      Follow up with Liane Menchaca/ [facility] for f/u in 1-2 week and Repeat ERCP in 3 months for stent removal.    - surgery team will follow up in about 2 weeks with phone call     Activity    Your activity upon discharge: activity as tolerated and no lifting, driving, or strenuous exercise for 4 weeks      Diet    Follow this diet upon discharge: Orders Placed This Encounter      Advance Diet as Tolerated: Regular Diet Adult       Significant Results and Procedures   Results for orders placed or performed during the hospital encounter of 04/09/22   XR Surgery CHIQUIS Fluoro L/T 5 Min w Stills    Narrative    SURGERY C-ARM FLUORO LESS THAN 5 MINUTES WITH STILLS 4/10/2022 2:21 PM      COMPARISON: None    HISTORY: Cholecystitis, choledocholithiasis    NUMBER OF IMAGES ACQUIRED: 4    VIEWS: AP    FLUOROSCOPY TIME: 1.4      Impression    IMPRESSION: 4 images obtained during ERCP demonstrate cannulation of  the biliary tree that is not significantly dilated but contains a  filling defect compatible with choledocholithiasis. There is also  cannulation opacification of the pancreatic duct with subsequent  pancreatic duct stent placement. At the end of the study, a common  bile duct stent was also placed. Correlate with operative report.    DAWN JASSO MD         SYSTEM ID:  [id number]   US Upper Extremity Venous Duplex Right    [facility]    DATE: 4/12/2022 12:10 PM    EXAM: RIGHT LOWER EXTREMITY VENOUS ULTRASOUND    INDICATION: Pain and swelling.    TECHNIQUE: Duplex imaging was performed utilizing gray-scale,  compression, augmentation as appropriate, color-flow, Doppler waveform  analysis, and spectral Doppler imaging.    COMPARISON: No pertinent comparison study is available for review.    FINDINGS:  RIGHT UPPER EXTREMITY: The internal jugular, axillary, cephalic,  basilic, brachial, radial, and ulnar veins are patent and compressible  with appropriate vascular waveforms. No deep venous thrombus is  identified.      Impression    IMPRESSION:   1.  RIGHT UPPER EXTREMITY: Negative for deep vein thrombosis.    CORINA ANDREWS MD         SYSTEM ID:  [id number]       Discharge Medications   Current Discharge Medication List      START taking these medications    Details   oxyCODONE (ROXICODONE) 5 MG  tablet Take 1 tablet (5 mg) by mouth every 4 hours as needed for moderate to severe pain  Qty: 12 tablet, Refills: 0    Associated Diagnoses: Choledocholithiasis      senna-docusate (SENOKOT-S/PERICOLACE) 8.6-50 MG tablet Take 1 tablet by mouth as needed in the morning and 1 tablet as needed in the evening for constipation.  Qty: 20 tablet, Refills: 0    Associated Diagnoses: Choledocholithiasis         CONTINUE these medications which have NOT CHANGED    Details   albuterol (PROAIR HFA/PROVENTIL HFA/VENTOLIN HFA) 108 (90 Base) MCG/ACT inhaler Inhale 1-2 puffs into the lungs every 6 hours as needed for shortness of breath / dyspnea or wheezing      levothyroxine (SYNTHROID/LEVOTHROID) 200 MCG tablet Take 200 mcg by mouth See Admin Instructions Six days a week      norgestim-eth estrad triphasic (ORTHO TRI-CYCLEN LO) 0.18/0.215/0.25 MG-25 MCG tablet Take 1 tablet by mouth in the morning.           Allergies   Allergies   Allergen Reactions     Keflex [Cephalexin Hcl]      rash     Sulfa Drugs      Shortness of breath

## 2022-04-12 NOTE — PROGRESS NOTES
[facility]  Gastroenterology Progress Note     Maria Isabel Garcia MRN# [id number]   YOB: 1980 Age: 41 year old          Assessment and Plan:   Maria Isabel Garcia is a 41 year old female admitted on 4/9/22 with abdominal pain, nausea and vomiting, elevated LFTS.    Choledocholithiasis  LFTS trending down  EUS findings consistent with multiple CBD stones and moderate to large amount of sludge in the common bile duct.  4/10 ERCP findings consistent with multiple CBD stones and sludge.  Sphincterotomy was done CBD stent was placed and also pancreatic duct stent was placed.  4/11 Underwent Lap Cholecystectomy    -- regular diet  -- Repeat ERCP in 3 months for stent removal.  -- Ok with [facility] [place], operated by [facility] to discharge patient with plans for outpatient f/u in 1-2 weeks            Interval History:   no new complaints, denies chest pain, denies shortness of breath, pain is controlled and minimal abdominal pain, passing flatus, tolerating diet.              Review of Systems:   C: NEGATIVE for fever, chills, change in weight  E/M: NEGATIVE for ear, mouth and throat problems  R: NEGATIVE for significant cough or SOB  CV: NEGATIVE for chest pain, palpitations or peripheral edema             Medications:   I have reviewed this patient's current medications    cefTRIAXone  2 g Intravenous Q24H     famotidine  20 mg Intravenous Q12H     levothyroxine  200 mcg Oral Once per day on Sun Mon Tue Wed Thu Sat     polyethylene glycol  17 g Oral Daily     senna-docusate  2 tablet Oral BID     sodium chloride (PF)  3 mL Intracatheter Q8H                  Physical Exam:   Vitals were reviewed  Vital Signs with Ranges  Temp:  [97.8  F (36.6  C)-98.6  F (37  C)] 98  F (36.7  C)  Pulse:  [56-84] 84  Resp:  [11-16] 16  BP: (116-141)/(71-91) 138/88  SpO2:  [96 %-100 %] 97 %  I/O last 3 completed shifts:  In: 1440 [P.O.:240; I.V.:1200]  Out: -   Constitutional: healthy, alert and no distress   Cardiovascular: negative, PMI  normal. No lifts, heaves, or thrills. RRR. No murmurs, clicks gallops or rub  Respiratory: negative, Percussion normal. Good diaphragmatic excursion. Lungs clear  Abdomen: Abdomen soft, non-tender. BS normal. No masses, organomegaly, positive findings: tenderness mild epigastric and RUQ           Data:   I reviewed the patient's new clinical lab test results.   Recent Labs   Lab Test 04/12/22 0638 04/11/22  0638 04/10/22  0606   WBC 10.1 6.5  --    HGB 9.8* 10.1*  --    MCV 83 81  --     295  --    INR  --   --  0.99     Recent Labs   Lab Test 04/12/22  0638 04/11/22  0638 04/10/22  1636 04/10/22  0755 04/10/22  0606   POTASSIUM 3.8 4.3 4.2   < > 3.3*   CHLORIDE 108 109  --   --  109   CO2 26 25  --   --  23   BUN 4* 4*  --   --  6*   ANIONGAP 3 3  --   --  7    < > = values in this interval not displayed.     Recent Labs   Lab Test 04/12/22 0638 04/11/22  0638 04/10/22  0606   ALBUMIN 2.6* 2.9* 3.0*   BILITOTAL 0.5 0.8 3.0*   * 162* 193*   AST 50* 76* 140*       I reviewed the patient's new imaging results.    All laboratory data reviewed  All imaging studies reviewed by me.    Anisa Cabrera PA-C,  4/12/2022  [facility]  Office : [phone]  Cell: [phone] (Dr. Rob)  Cell: [phone] (Anisa Cabrera PA-C)

## 2022-04-12 NOTE — PLAN OF CARE
Patient A/Ox4,VSS. Incision CDI. Report tat  her  swollen arm is swollen this AM- checked IV access. No sign of infiltration.IV removed, arm was elevated and cold pack applied.     Discharged at 1600 with .

## 2022-04-12 NOTE — PLAN OF CARE
Shift Summary [id number]    Admitting Diagnosis: Choledocholithiasis [K80.50]   Vitals WDL ex BP's 140's/90's  Pain in abdomen. Taking Oxycodone and Simethicone PRN  A&Ox4  Voiding without issue. + menses  Mobility - up with SBA  CMS intact  Lung Sounds clear on room air  GI - bowel sounds hypoactive.   4 lap sites to abd CDI. C/o gas pains in abdomen radiating to right shoulder - slightly improved after walk.  D5 .45 with 20K infusing at 100 mL/hr.     K+ replacement protocol. Pt requesting bowel meds.     Plan: Home today?

## 2022-04-12 NOTE — PLAN OF CARE
Goal Outcome Evaluation:      A&O x4. Lapsites CDI. Patient reports pain around 4/10. Simethicone and  oxycodone given to manage pain. SBA, on capno. VSS on RA ex htn. IVF infusing at 100 ml/hour.

## 2022-04-13 LAB
PATH REPORT.COMMENTS IMP SPEC: NORMAL
PATH REPORT.COMMENTS IMP SPEC: NORMAL
PATH REPORT.FINAL DX SPEC: NORMAL
PATH REPORT.GROSS SPEC: NORMAL
PATH REPORT.MICROSCOPIC SPEC OTHER STN: NORMAL
PATH REPORT.RELEVANT HX SPEC: NORMAL
PHOTO IMAGE: NORMAL

## 2022-04-13 PROCEDURE — 88304 TISSUE EXAM BY PATHOLOGIST: CPT | Mod: 26 | Performed by: PATHOLOGY

## 2022-04-26 ENCOUNTER — TELEPHONE (OUTPATIENT)
Dept: OTHER | Facility: CLINIC | Age: 42
End: 2022-04-26
Payer: COMMERCIAL

## 2022-04-26 NOTE — TELEPHONE ENCOUNTER
[facility]  Post op call note - Laparoscopic Cholecystectomy    Maria Isabel Garcia was called for an update regarding her recovery.  She underwent a laparoscopic cholecystectomy by Dr. Desir on 4/11/22.      There was no answer and a message was left requesting a return call if she has any further questions regarding her surgery or recovery.      Glen Burt PA-C  Office: [phone]  Pager: [phone]    The pathology revealed   Final Diagnosis   Gallbladder, cholecystectomy:  -Chronic cholecystitis with cholelithiasis  -Benign regional lymph node

## 2022-10-06 ENCOUNTER — LAB REQUISITION (OUTPATIENT)
Dept: LAB | Facility: CLINIC | Age: 42
End: 2022-10-06

## 2022-10-06 DIAGNOSIS — Z01.419 ENCOUNTER FOR GYNECOLOGICAL EXAMINATION (GENERAL) (ROUTINE) WITHOUT ABNORMAL FINDINGS: ICD-10-CM

## 2022-10-06 PROCEDURE — G0124 SCREEN C/V THIN LAYER BY MD: HCPCS | Performed by: PATHOLOGY

## 2022-10-06 PROCEDURE — G0145 SCR C/V CYTO,THINLAYER,RESCR: HCPCS | Performed by: OBSTETRICS & GYNECOLOGY

## 2022-10-06 PROCEDURE — 87624 HPV HI-RISK TYP POOLED RSLT: CPT | Performed by: OBSTETRICS & GYNECOLOGY

## 2022-10-12 LAB
BKR LAB AP GYN ADEQUACY: ABNORMAL
BKR LAB AP GYN INTERPRETATION: ABNORMAL
BKR LAB AP HPV REFLEX: ABNORMAL
BKR LAB AP LMP: ABNORMAL
BKR LAB AP PREVIOUS ABNL DX: ABNORMAL
BKR LAB AP PREVIOUS ABNORMAL: ABNORMAL
PATH REPORT.COMMENTS IMP SPEC: ABNORMAL
PATH REPORT.COMMENTS IMP SPEC: ABNORMAL
PATH REPORT.RELEVANT HX SPEC: ABNORMAL

## 2022-10-14 LAB
HUMAN PAPILLOMA VIRUS 16 DNA: NEGATIVE
HUMAN PAPILLOMA VIRUS 18 DNA: NEGATIVE
HUMAN PAPILLOMA VIRUS FINAL DIAGNOSIS: NORMAL
HUMAN PAPILLOMA VIRUS OTHER HR: NEGATIVE

## 2024-01-29 ENCOUNTER — LAB REQUISITION (OUTPATIENT)
Dept: LAB | Facility: CLINIC | Age: 44
End: 2024-01-29

## 2024-01-29 DIAGNOSIS — E03.9 HYPOTHYROIDISM, UNSPECIFIED: ICD-10-CM

## 2024-01-29 PROCEDURE — 84480 ASSAY TRIIODOTHYRONINE (T3): CPT | Performed by: OBSTETRICS & GYNECOLOGY

## 2024-01-29 PROCEDURE — 84443 ASSAY THYROID STIM HORMONE: CPT | Performed by: OBSTETRICS & GYNECOLOGY

## 2024-01-29 PROCEDURE — 84439 ASSAY OF FREE THYROXINE: CPT | Performed by: OBSTETRICS & GYNECOLOGY

## 2024-01-30 LAB
T3 SERPL-MCNC: 94 NG/DL (ref 85–202)
T4 FREE SERPL-MCNC: 1.18 NG/DL (ref 0.9–1.7)
TSH SERPL DL<=0.005 MIU/L-ACNC: 10.4 UIU/ML (ref 0.3–4.2)

## 2025-03-24 ENCOUNTER — LAB REQUISITION (OUTPATIENT)
Dept: LAB | Facility: CLINIC | Age: 45
End: 2025-03-24

## 2025-03-24 DIAGNOSIS — Z01.419 ENCOUNTER FOR GYNECOLOGICAL EXAMINATION (GENERAL) (ROUTINE) WITHOUT ABNORMAL FINDINGS: ICD-10-CM

## 2025-03-24 DIAGNOSIS — E03.9 HYPOTHYROIDISM, UNSPECIFIED: ICD-10-CM

## 2025-03-24 LAB
EST. AVERAGE GLUCOSE BLD GHB EST-MCNC: 114 MG/DL
HBA1C MFR BLD: 5.6 %

## 2025-03-24 PROCEDURE — 84443 ASSAY THYROID STIM HORMONE: CPT | Performed by: OBSTETRICS & GYNECOLOGY

## 2025-03-24 PROCEDURE — 84480 ASSAY TRIIODOTHYRONINE (T3): CPT | Performed by: OBSTETRICS & GYNECOLOGY

## 2025-03-24 PROCEDURE — 84439 ASSAY OF FREE THYROXINE: CPT | Performed by: OBSTETRICS & GYNECOLOGY

## 2025-03-24 PROCEDURE — 83036 HEMOGLOBIN GLYCOSYLATED A1C: CPT | Performed by: OBSTETRICS & GYNECOLOGY

## 2025-03-25 LAB
T3 SERPL-MCNC: 103 NG/DL (ref 85–202)
T4 FREE SERPL-MCNC: 1.55 NG/DL (ref 0.9–1.7)
TSH SERPL DL<=0.005 MIU/L-ACNC: 8.48 UIU/ML (ref 0.3–4.2)

## (undated) DEVICE — GLOVE PROTEXIS MICRO 6.0  2D73PM60

## (undated) DEVICE — SOL NACL 0.9% IRRIG 1000ML BOTTLE 2F7124

## (undated) DEVICE — ENDO POUCH UNIV RETRIEVAL SYSTEM INZII 10MM CD001

## (undated) DEVICE — ESU GROUND PAD ADULT W/CORD E7507

## (undated) DEVICE — ENDO SCOPE WARMER LF TM500

## (undated) DEVICE — ESU GROUND PAD UNIVERSAL W/O CORD

## (undated) DEVICE — SOL WATER IRRIG 1000ML BOTTLE 2F7114

## (undated) DEVICE — SU PDS II 0 ENDOLOOP EZ10G

## (undated) DEVICE — SU VICRYL 3-0 SH 27" J316H

## (undated) DEVICE — PREP CHLORAPREP 26ML TINTED ORANGE  260815

## (undated) DEVICE — NDL CANNULA INTERLINK BLUNT 18GA

## (undated) DEVICE — DEVICE SUTURE GRASPER TROCAR CLOSURE 14GA PMITCSG

## (undated) DEVICE — PACK LAP CHOLE SLC15LCFSD

## (undated) DEVICE — Device

## (undated) DEVICE — SOL NACL 0.9% INJ 1000ML BAG 2B1324X

## (undated) DEVICE — SU VICRYL 0 UR-6 27" J603H

## (undated) DEVICE — ENDO TROCAR FIRST ENTRY KII FIOS Z-THRD 12X100MM CTF73

## (undated) DEVICE — GLOVE PROTEXIS BLUE W/NEU-THERA 6.5  2D73EB65

## (undated) DEVICE — DRSG GAUZE 4X4" 3033

## (undated) DEVICE — ENDO TROCAR FIRST ENTRY KII FIOS Z-THRD 05X100MM CTF03

## (undated) DEVICE — SU MONOCRYL 4-0 PS-2 18" UND Y496G

## (undated) DEVICE — CLIP APPLIER ENDO 5MM M/L LIGAMAX EL5ML

## (undated) DEVICE — ENDO TROCAR SLEEVE KII Z-THREADED 05X100MM CTS02

## (undated) DEVICE — RAD RX ISOVUE 300 (50ML) 61% IOPAMIDOL CHARGE PER ML

## (undated) DEVICE — ESU HOLDER LAP INST DISP PURPLE LONG 330MM H-PRO-330

## (undated) DEVICE — ENDO CATH BALLOON EXTRACTOR PRO RX 12-15MM 4701

## (undated) DEVICE — SUCTION IRR STRYKERFLOW II W/TIP 250-070-520

## (undated) DEVICE — LINEN TOWEL PACK X5 5464

## (undated) RX ORDER — ONDANSETRON 2 MG/ML
INJECTION INTRAMUSCULAR; INTRAVENOUS
Status: DISPENSED
Start: 2022-04-11

## (undated) RX ORDER — NEOSTIGMINE METHYLSULFATE 1 MG/ML
VIAL (ML) INJECTION
Status: DISPENSED
Start: 2022-04-11

## (undated) RX ORDER — ONDANSETRON 2 MG/ML
INJECTION INTRAMUSCULAR; INTRAVENOUS
Status: DISPENSED
Start: 2022-04-10

## (undated) RX ORDER — FENTANYL CITRATE 50 UG/ML
INJECTION, SOLUTION INTRAMUSCULAR; INTRAVENOUS
Status: DISPENSED
Start: 2022-04-10

## (undated) RX ORDER — PROPOFOL 10 MG/ML
INJECTION, EMULSION INTRAVENOUS
Status: DISPENSED
Start: 2022-04-11

## (undated) RX ORDER — HYDROMORPHONE HYDROCHLORIDE 1 MG/ML
INJECTION, SOLUTION INTRAMUSCULAR; INTRAVENOUS; SUBCUTANEOUS
Status: DISPENSED
Start: 2022-04-11

## (undated) RX ORDER — FENTANYL CITRATE 50 UG/ML
INJECTION, SOLUTION INTRAMUSCULAR; INTRAVENOUS
Status: DISPENSED
Start: 2022-04-11

## (undated) RX ORDER — LIDOCAINE HYDROCHLORIDE 20 MG/ML
INJECTION, SOLUTION EPIDURAL; INFILTRATION; INTRACAUDAL; PERINEURAL
Status: DISPENSED
Start: 2022-04-10

## (undated) RX ORDER — GLYCOPYRROLATE 0.2 MG/ML
INJECTION, SOLUTION INTRAMUSCULAR; INTRAVENOUS
Status: DISPENSED
Start: 2022-04-11

## (undated) RX ORDER — HYDROMORPHONE HCL IN WATER/PF 6 MG/30 ML
PATIENT CONTROLLED ANALGESIA SYRINGE INTRAVENOUS
Status: DISPENSED
Start: 2022-04-11

## (undated) RX ORDER — LIDOCAINE HYDROCHLORIDE 10 MG/ML
INJECTION, SOLUTION INFILTRATION; PERINEURAL
Status: DISPENSED
Start: 2022-04-11

## (undated) RX ORDER — BUPIVACAINE HYDROCHLORIDE AND EPINEPHRINE 5; 5 MG/ML; UG/ML
INJECTION, SOLUTION EPIDURAL; INTRACAUDAL; PERINEURAL
Status: DISPENSED
Start: 2022-04-11

## (undated) RX ORDER — CEFAZOLIN SODIUM/WATER 2 G/20 ML
SYRINGE (ML) INTRAVENOUS
Status: DISPENSED
Start: 2022-04-11

## (undated) RX ORDER — LIDOCAINE HYDROCHLORIDE 20 MG/ML
INJECTION, SOLUTION EPIDURAL; INFILTRATION; INTRACAUDAL; PERINEURAL
Status: DISPENSED
Start: 2022-04-11